# Patient Record
Sex: MALE | Race: WHITE | NOT HISPANIC OR LATINO
[De-identification: names, ages, dates, MRNs, and addresses within clinical notes are randomized per-mention and may not be internally consistent; named-entity substitution may affect disease eponyms.]

---

## 2018-02-09 PROBLEM — Z00.00 ENCOUNTER FOR PREVENTIVE HEALTH EXAMINATION: Status: ACTIVE | Noted: 2018-02-09

## 2018-03-16 ENCOUNTER — APPOINTMENT (OUTPATIENT)
Dept: NEUROLOGY | Facility: CLINIC | Age: 83
End: 2018-03-16
Payer: MEDICARE

## 2018-03-16 VITALS
DIASTOLIC BLOOD PRESSURE: 90 MMHG | WEIGHT: 175 LBS | SYSTOLIC BLOOD PRESSURE: 160 MMHG | HEIGHT: 67 IN | BODY MASS INDEX: 27.47 KG/M2

## 2018-03-16 DIAGNOSIS — Z86.39 PERSONAL HISTORY OF OTHER ENDOCRINE, NUTRITIONAL AND METABOLIC DISEASE: ICD-10-CM

## 2018-03-16 PROCEDURE — 99204 OFFICE O/P NEW MOD 45 MIN: CPT

## 2018-12-25 ENCOUNTER — INPATIENT (INPATIENT)
Facility: HOSPITAL | Age: 83
LOS: 5 days | Discharge: HOME | DRG: 62 | End: 2018-12-31
Attending: HOSPITALIST | Admitting: INTERNAL MEDICINE
Payer: MEDICARE

## 2018-12-25 VITALS
HEIGHT: 66 IN | RESPIRATION RATE: 18 BRPM | HEART RATE: 75 BPM | OXYGEN SATURATION: 97 % | WEIGHT: 182.1 LBS | DIASTOLIC BLOOD PRESSURE: 84 MMHG | TEMPERATURE: 98 F | SYSTOLIC BLOOD PRESSURE: 223 MMHG

## 2018-12-25 PROCEDURE — 99291 CRITICAL CARE FIRST HOUR: CPT

## 2018-12-25 NOTE — ED PROVIDER NOTE - MEDICAL DECISION MAKING DETAILS
89 y/o male presents with confusion with last known well at 9:30 PM. 89 y/o male presents with confusion with last known well at 9:30 PM- pt with difficulty finding words, confused - unable toname objects with left temporal infarct - pt's family explained the risks and benefits of tpa and since there is hypodense region on CT increased risk of bleed also spoke to son carrillo on the phone all family members would like to proceed with tpa at this time. Spoke with PA MICU admit to MICU   CTA also ordered 89 y/o male presents with confusion with last known well at 9:30 PM- pt with difficulty finding words, confused - unable toname objects with left temporal infarct - pt's family explained the risks and benefits of tpa and since there is hypodense region on CT increased risk of bleed also spoke to son carrillo on the phone all family members would like to proceed with tpa at this time. There was also a delay in TPA administration due to blood pressure control. Spoke with PA MICU admit to MICU   CTA also ordered

## 2018-12-25 NOTE — ED ADULT TRIAGE NOTE - CHIEF COMPLAINT QUOTE
daughter states father at 2pm was well and at 230pm had slurred speech now resolved, one hour ago became confused and did not know who and where he was

## 2018-12-25 NOTE — ED PROVIDER NOTE - CRITICAL CARE PROVIDED
documentation/consult w/ pt's family directly relating to pts condition/additional history taking/consultation with other physicians/interpretation of diagnostic studies

## 2018-12-25 NOTE — ED PROVIDER NOTE - OBJECTIVE STATEMENT
89 y/o male presents with confusion. Patient noted to have woken up from nap with slurred speech and c/o of pain to the side of his nose around 2:30 PM.  Symptoms subsided on their own shortly after and patient was known to be well until 9:30 PM when he began forgetting family members names. Family at bedside reports that pt has been forgetful lately but has never forgotten names or been this severe in the past. Patient not on any medications. No alcohol consumption. Pt denies fevers/chills, ha, loc, cp/sob/palp, cough, abd pain/n/v/d, urinary symptoms, recent travel and sick contacts.  No further complaints at this time.

## 2018-12-26 ENCOUNTER — TRANSCRIPTION ENCOUNTER (OUTPATIENT)
Age: 83
End: 2018-12-26

## 2018-12-26 DIAGNOSIS — I63.412 CEREBRAL INFARCTION DUE TO EMBOLISM OF LEFT MIDDLE CEREBRAL ARTERY: ICD-10-CM

## 2018-12-26 DIAGNOSIS — I63.9 CEREBRAL INFARCTION, UNSPECIFIED: ICD-10-CM

## 2018-12-26 DIAGNOSIS — Z95.5 PRESENCE OF CORONARY ANGIOPLASTY IMPLANT AND GRAFT: Chronic | ICD-10-CM

## 2018-12-26 LAB
ALBUMIN SERPL ELPH-MCNC: 4.1 G/DL — SIGNIFICANT CHANGE UP (ref 3.3–5.2)
ALP SERPL-CCNC: 92 U/L — SIGNIFICANT CHANGE UP (ref 40–120)
ALT FLD-CCNC: 14 U/L — SIGNIFICANT CHANGE UP
AMMONIA BLD-MCNC: 34 UMOL/L — SIGNIFICANT CHANGE UP (ref 11–55)
AMPHET UR-MCNC: NEGATIVE — SIGNIFICANT CHANGE UP
ANION GAP SERPL CALC-SCNC: 9 MMOL/L — SIGNIFICANT CHANGE UP (ref 5–17)
APPEARANCE UR: CLEAR — SIGNIFICANT CHANGE UP
APTT BLD: 30.3 SEC — SIGNIFICANT CHANGE UP (ref 27.5–36.3)
AST SERPL-CCNC: 18 U/L — SIGNIFICANT CHANGE UP
BACTERIA # UR AUTO: ABNORMAL
BARBITURATES UR SCN-MCNC: NEGATIVE — SIGNIFICANT CHANGE UP
BASOPHILS # BLD AUTO: 0 K/UL — SIGNIFICANT CHANGE UP (ref 0–0.2)
BASOPHILS NFR BLD AUTO: 0.4 % — SIGNIFICANT CHANGE UP (ref 0–2)
BENZODIAZ UR-MCNC: NEGATIVE — SIGNIFICANT CHANGE UP
BILIRUB SERPL-MCNC: 0.3 MG/DL — LOW (ref 0.4–2)
BILIRUB UR-MCNC: NEGATIVE — SIGNIFICANT CHANGE UP
BUN SERPL-MCNC: 26 MG/DL — HIGH (ref 8–20)
CALCIUM SERPL-MCNC: 8.5 MG/DL — LOW (ref 8.6–10.2)
CHLORIDE SERPL-SCNC: 104 MMOL/L — SIGNIFICANT CHANGE UP (ref 98–107)
CK MB CFR SERPL CALC: 7.8 NG/ML — HIGH (ref 0–6.7)
CK SERPL-CCNC: 160 U/L — SIGNIFICANT CHANGE UP (ref 30–200)
CO2 SERPL-SCNC: 28 MMOL/L — SIGNIFICANT CHANGE UP (ref 22–29)
COCAINE METAB.OTHER UR-MCNC: NEGATIVE — SIGNIFICANT CHANGE UP
COLOR SPEC: YELLOW — SIGNIFICANT CHANGE UP
CREAT SERPL-MCNC: 1.23 MG/DL — SIGNIFICANT CHANGE UP (ref 0.5–1.3)
DIFF PNL FLD: ABNORMAL
EOSINOPHIL # BLD AUTO: 0.1 K/UL — SIGNIFICANT CHANGE UP (ref 0–0.5)
EOSINOPHIL NFR BLD AUTO: 1.7 % — SIGNIFICANT CHANGE UP (ref 0–6)
EPI CELLS # UR: SIGNIFICANT CHANGE UP
ETHANOL SERPL-MCNC: <10 MG/DL — SIGNIFICANT CHANGE UP
GLUCOSE SERPL-MCNC: 96 MG/DL — SIGNIFICANT CHANGE UP (ref 70–115)
GLUCOSE UR QL: NEGATIVE MG/DL — SIGNIFICANT CHANGE UP
HCT VFR BLD CALC: 41.5 % — LOW (ref 42–52)
HGB BLD-MCNC: 13.8 G/DL — LOW (ref 14–18)
INR BLD: 0.97 RATIO — SIGNIFICANT CHANGE UP (ref 0.88–1.16)
KETONES UR-MCNC: NEGATIVE — SIGNIFICANT CHANGE UP
LEUKOCYTE ESTERASE UR-ACNC: NEGATIVE — SIGNIFICANT CHANGE UP
LYMPHOCYTES # BLD AUTO: 1.6 K/UL — SIGNIFICANT CHANGE UP (ref 1–4.8)
LYMPHOCYTES # BLD AUTO: 22.3 % — SIGNIFICANT CHANGE UP (ref 20–55)
MCHC RBC-ENTMCNC: 30.7 PG — SIGNIFICANT CHANGE UP (ref 27–31)
MCHC RBC-ENTMCNC: 33.3 G/DL — SIGNIFICANT CHANGE UP (ref 32–36)
MCV RBC AUTO: 92.4 FL — SIGNIFICANT CHANGE UP (ref 80–94)
METHADONE UR-MCNC: NEGATIVE — SIGNIFICANT CHANGE UP
MONOCYTES # BLD AUTO: 0.6 K/UL — SIGNIFICANT CHANGE UP (ref 0–0.8)
MONOCYTES NFR BLD AUTO: 8.6 % — SIGNIFICANT CHANGE UP (ref 3–10)
NEUTROPHILS # BLD AUTO: 4.7 K/UL — SIGNIFICANT CHANGE UP (ref 1.8–8)
NEUTROPHILS NFR BLD AUTO: 66.7 % — SIGNIFICANT CHANGE UP (ref 37–73)
NITRITE UR-MCNC: NEGATIVE — SIGNIFICANT CHANGE UP
OPIATES UR-MCNC: NEGATIVE — SIGNIFICANT CHANGE UP
PCP SPEC-MCNC: SIGNIFICANT CHANGE UP
PCP UR-MCNC: NEGATIVE — SIGNIFICANT CHANGE UP
PH UR: 7 — SIGNIFICANT CHANGE UP (ref 5–8)
PLATELET # BLD AUTO: 174 K/UL — SIGNIFICANT CHANGE UP (ref 150–400)
POTASSIUM SERPL-MCNC: 4 MMOL/L — SIGNIFICANT CHANGE UP (ref 3.5–5.3)
POTASSIUM SERPL-SCNC: 4 MMOL/L — SIGNIFICANT CHANGE UP (ref 3.5–5.3)
PROT SERPL-MCNC: 6.9 G/DL — SIGNIFICANT CHANGE UP (ref 6.6–8.7)
PROT UR-MCNC: 30 MG/DL
PROTHROM AB SERPL-ACNC: 11.1 SEC — SIGNIFICANT CHANGE UP (ref 10–12.9)
RBC # BLD: 4.49 M/UL — LOW (ref 4.6–6.2)
RBC # FLD: 13.5 % — SIGNIFICANT CHANGE UP (ref 11–15.6)
RBC CASTS # UR COMP ASSIST: SIGNIFICANT CHANGE UP /HPF (ref 0–4)
SODIUM SERPL-SCNC: 141 MMOL/L — SIGNIFICANT CHANGE UP (ref 135–145)
SP GR SPEC: 1.01 — SIGNIFICANT CHANGE UP (ref 1.01–1.02)
THC UR QL: NEGATIVE — SIGNIFICANT CHANGE UP
TROPONIN T SERPL-MCNC: 0.02 NG/ML — SIGNIFICANT CHANGE UP (ref 0–0.06)
UROBILINOGEN FLD QL: NEGATIVE MG/DL — SIGNIFICANT CHANGE UP
WBC # BLD: 7.1 K/UL — SIGNIFICANT CHANGE UP (ref 4.8–10.8)
WBC # FLD AUTO: 7.1 K/UL — SIGNIFICANT CHANGE UP (ref 4.8–10.8)
WBC UR QL: SIGNIFICANT CHANGE UP

## 2018-12-26 PROCEDURE — 70496 CT ANGIOGRAPHY HEAD: CPT | Mod: 26

## 2018-12-26 PROCEDURE — 70450 CT HEAD/BRAIN W/O DYE: CPT | Mod: 26,59

## 2018-12-26 PROCEDURE — 99223 1ST HOSP IP/OBS HIGH 75: CPT

## 2018-12-26 PROCEDURE — 70498 CT ANGIOGRAPHY NECK: CPT | Mod: 26

## 2018-12-26 PROCEDURE — 93306 TTE W/DOPPLER COMPLETE: CPT | Mod: 26

## 2018-12-26 PROCEDURE — 93010 ELECTROCARDIOGRAM REPORT: CPT

## 2018-12-26 PROCEDURE — 70450 CT HEAD/BRAIN W/O DYE: CPT | Mod: 26,77,59

## 2018-12-26 PROCEDURE — 99233 SBSQ HOSP IP/OBS HIGH 50: CPT

## 2018-12-26 RX ORDER — ATORVASTATIN CALCIUM 80 MG/1
80 TABLET, FILM COATED ORAL AT BEDTIME
Qty: 0 | Refills: 0 | Status: DISCONTINUED | OUTPATIENT
Start: 2018-12-26 | End: 2018-12-31

## 2018-12-26 RX ORDER — NICARDIPINE HYDROCHLORIDE 30 MG/1
5 CAPSULE, EXTENDED RELEASE ORAL
Qty: 40 | Refills: 0 | Status: DISCONTINUED | OUTPATIENT
Start: 2018-12-26 | End: 2018-12-26

## 2018-12-26 RX ORDER — ACETAMINOPHEN 500 MG
1000 TABLET ORAL ONCE
Qty: 0 | Refills: 0 | Status: COMPLETED | OUTPATIENT
Start: 2018-12-26 | End: 2018-12-26

## 2018-12-26 RX ORDER — ONDANSETRON 8 MG/1
4 TABLET, FILM COATED ORAL ONCE
Qty: 0 | Refills: 0 | Status: COMPLETED | OUTPATIENT
Start: 2018-12-26 | End: 2018-12-26

## 2018-12-26 RX ORDER — METOCLOPRAMIDE HCL 10 MG
10 TABLET ORAL ONCE
Qty: 0 | Refills: 0 | Status: COMPLETED | OUTPATIENT
Start: 2018-12-26 | End: 2018-12-26

## 2018-12-26 RX ORDER — ALTEPLASE 100 MG
7 KIT INTRAVENOUS ONCE
Qty: 0 | Refills: 0 | Status: COMPLETED | OUTPATIENT
Start: 2018-12-26 | End: 2018-12-26

## 2018-12-26 RX ORDER — MAGNESIUM SULFATE 500 MG/ML
2 VIAL (ML) INJECTION ONCE
Qty: 0 | Refills: 0 | Status: COMPLETED | OUTPATIENT
Start: 2018-12-26 | End: 2018-12-26

## 2018-12-26 RX ORDER — LABETALOL HCL 100 MG
10 TABLET ORAL ONCE
Qty: 0 | Refills: 0 | Status: COMPLETED | OUTPATIENT
Start: 2018-12-26 | End: 2018-12-26

## 2018-12-26 RX ORDER — ALTEPLASE 100 MG
66 KIT INTRAVENOUS ONCE
Qty: 0 | Refills: 0 | Status: COMPLETED | OUTPATIENT
Start: 2018-12-26 | End: 2018-12-26

## 2018-12-26 RX ORDER — HYDRALAZINE HCL 50 MG
10 TABLET ORAL
Qty: 0 | Refills: 0 | Status: DISCONTINUED | OUTPATIENT
Start: 2018-12-26 | End: 2018-12-27

## 2018-12-26 RX ORDER — FENTANYL CITRATE 50 UG/ML
25 INJECTION INTRAVENOUS
Qty: 0 | Refills: 0 | Status: DISCONTINUED | OUTPATIENT
Start: 2018-12-26 | End: 2018-12-31

## 2018-12-26 RX ORDER — ACETAMINOPHEN 500 MG
650 TABLET ORAL EVERY 6 HOURS
Qty: 0 | Refills: 0 | Status: DISCONTINUED | OUTPATIENT
Start: 2018-12-26 | End: 2018-12-31

## 2018-12-26 RX ORDER — ATORVASTATIN CALCIUM 80 MG/1
1 TABLET, FILM COATED ORAL
Qty: 0 | Refills: 0 | COMMUNITY

## 2018-12-26 RX ADMIN — ALTEPLASE 7 MILLIGRAM(S): KIT at 01:07

## 2018-12-26 RX ADMIN — FENTANYL CITRATE 25 MICROGRAM(S): 50 INJECTION INTRAVENOUS at 09:00

## 2018-12-26 RX ADMIN — Medication 10 MILLIGRAM(S): at 00:46

## 2018-12-26 RX ADMIN — FENTANYL CITRATE 25 MICROGRAM(S): 50 INJECTION INTRAVENOUS at 08:44

## 2018-12-26 RX ADMIN — Medication 1000 MILLIGRAM(S): at 06:50

## 2018-12-26 RX ADMIN — Medication 650 MILLIGRAM(S): at 16:04

## 2018-12-26 RX ADMIN — NICARDIPINE HYDROCHLORIDE 25 MG/HR: 30 CAPSULE, EXTENDED RELEASE ORAL at 02:23

## 2018-12-26 RX ADMIN — Medication 400 MILLIGRAM(S): at 06:30

## 2018-12-26 RX ADMIN — ONDANSETRON 4 MILLIGRAM(S): 8 TABLET, FILM COATED ORAL at 02:56

## 2018-12-26 RX ADMIN — Medication 50 GRAM(S): at 03:00

## 2018-12-26 RX ADMIN — Medication 10 MILLIGRAM(S): at 15:33

## 2018-12-26 RX ADMIN — Medication 650 MILLIGRAM(S): at 21:40

## 2018-12-26 RX ADMIN — Medication 10 MILLIGRAM(S): at 03:13

## 2018-12-26 RX ADMIN — Medication 650 MILLIGRAM(S): at 15:34

## 2018-12-26 RX ADMIN — ALTEPLASE 420 MILLIGRAM(S): KIT at 01:06

## 2018-12-26 RX ADMIN — Medication 650 MILLIGRAM(S): at 22:30

## 2018-12-26 RX ADMIN — NICARDIPINE HYDROCHLORIDE 25 MG/HR: 30 CAPSULE, EXTENDED RELEASE ORAL at 00:50

## 2018-12-26 RX ADMIN — Medication 10 MILLIGRAM(S): at 08:19

## 2018-12-26 RX ADMIN — ALTEPLASE 66 MILLIGRAM(S): KIT at 02:00

## 2018-12-26 RX ADMIN — Medication 10 MILLIGRAM(S): at 02:16

## 2018-12-26 RX ADMIN — ALTEPLASE 66 MILLIGRAM(S): KIT at 01:09

## 2018-12-26 RX ADMIN — ATORVASTATIN CALCIUM 80 MILLIGRAM(S): 80 TABLET, FILM COATED ORAL at 21:04

## 2018-12-26 NOTE — CHART NOTE - NSCHARTNOTEFT_GEN_A_CORE
Spoke with family at bedside. Pt is currently NPO s/p tPA. Family reports pt eating well PTA, following a regular diet with no issues chewing/swallowing. Reports pt does not eat any foods with seeds or any spicy foods and follows an overall bland diet. Preferences obtained. RD remains available.

## 2018-12-26 NOTE — STROKE CODE NOTE - ASSESSMENT/PLAN
89 years old man with vascular risk factors of age is evaluated at OSH for acute onset of language disturbance. He was reported to be completely normal at around 8:30 PM when he had a normal fluent conversation with his wife. He took a nap thereafter and upon waking up at around 9 PM, he was noted to have acute onset of "memory disturbance" as he was not able to remember his family members names. Examination of her stroke reveals moderate to severe aphasia and disorientation to month and his age. CT brain shows early changes of ischemia in the left MCA distribution involving left temporal lobe.    Impression:  Cerebral embolism with cerebral infarction. Left MCA distribution stroke - likely etiology being embolic stroke from undetermined source, but possibility of large vessel disease needs to be ruled out

## 2018-12-26 NOTE — CHART NOTE - NSCHARTNOTEFT_GEN_A_CORE
-called by noa BECERRA, patient has just arrived to CTICU from ER  -hypertensive with   -cardene, which was running in ED, not running ON CTICU arrival, despite active order  -have discussed with noa RN will give IVP hydralazine, hang cardene STAT and begin titrating cardene for goal -160

## 2018-12-26 NOTE — DISCHARGE NOTE ADULT - MEDICATION SUMMARY - MEDICATIONS TO TAKE
I will START or STAY ON the medications listed below when I get home from the hospital:    aspirin 81 mg oral delayed release tablet  -- 1 tab(s) by mouth once a day  -- Indication: For Stroke    acetaminophen 325 mg oral tablet  -- 2 tab(s) by mouth every 6 hours, As needed, Mild Pain (1 - 3), Moderate Pain (4 - 6)  -- Indication: For Pain    atorvastatin 80 mg oral tablet  -- 1 tab(s) by mouth once a day  -- Indication: For Stroke Prevention    clopidogrel 75 mg oral tablet  -- 1 tab(s) by mouth once a day  -- Indication: For Stroke Prevention    amLODIPine 5 mg oral tablet  -- 1 tab(s) by mouth once a day  -- Indication: For High Blood Pressure I will START or STAY ON the medications listed below when I get home from the hospital:    aspirin 81 mg oral delayed release tablet  -- 1 tab(s) by mouth once a day  -- Indication: For Stroke    acetaminophen 325 mg oral tablet  -- 2 tab(s) by mouth every 6 hours, As needed, Mild Pain (1 - 3), Moderate Pain (4 - 6)  -- Indication: For Pain     atorvastatin 80 mg oral tablet  -- 1 tab(s) by mouth once a day  -- Indication: For Stroke    clopidogrel 75 mg oral tablet  -- 1 tab(s) by mouth once a day  -- Indication: For Stroke    amLODIPine 5 mg oral tablet  -- 1 tab(s) by mouth once a day  -- Indication: For Hypertension

## 2018-12-26 NOTE — STROKE CODE NOTE - SUBJECTIVE
89 years old man was reported to be completely normal. Up until 8:30 PM when he is in normal conversation with his wife before falling asleep. At around 9 PM, when he woke up; he was noted to have memory disturbance described as he was not able to remember the names of his family members. He was not reported to have any other focal neurological symptoms. Of note, he was also reported to have an episode of dysarthria at around 2:30 PM, which lasted for about half an hour or so with complete resolution of his neurological symptoms/deficits. He was reported to be completely normal with normal language function from 2:30 PM until 8:30 PM.

## 2018-12-26 NOTE — PROGRESS NOTE ADULT - ASSESSMENT
89 yo male with hx of CAD s/p remote PCI, admitted with aphasia, given TPA for stroke.  Currently with no focal motor deficits.  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    Impression:  stroke s/p TPA    Plan  - repeat CT brain at 24 hours if no bleeding then can start ASA and SC heparin  - awaiting echo, carotid imaging already performed  - neurology consult called  - Lipitor   - PT eval, dysphagia screen

## 2018-12-26 NOTE — DISCHARGE NOTE ADULT - PLAN OF CARE
Rehabilitation You had a infarction in your brain which means that your brain was not getting enough blood, and some of the brain was damaged, commonly called a stroke. This affected your ability to understand and speak a language.   It is important to make sure that you continue to take all medications as prescribed to help prevent having another stroke event. You will need Physical and Speech therapy, and it is important that you participate to the best of ability. Follow up with Neurology within 2 weeks and follow up with your PMD within one week. If you feel that you are having similar symptoms again, be sure to seek medical attention immediately as it is possible to have another stroke even if you just had one. Blood Pressure < 140/90 Please continue to take all medications as prescribed. Follow up with your PMD within a week. Eat a DASH diet outlined above  Please check your Blood pressure at home and if the top number is always over 150 or if the bottom number is always over 100, please call your doctor Monitor You have some blockage in one of the arteries leading to your brain on the right side. Some of the plaque from the artery blockage may have broken off and traveled to your brain causing the stroke. It is important that you take Aspirin, Plavix and the statin as prescribed. This will help prevent a recurrence. You were seen by vascular surgery and they do not recommend surgery at this time. Please follow up with your PMD within one week. On this hospital visit we found a lung mass, 2cm in size in the right upper lobe. Your family decided not to pursue treatment at this time. If this is not treated, it could grow in size and metastasize. The risks were discussed with your family. Please follow up with your PMD within one week. Control Continue to take your medicines as prescribed and follow up with your PMD within one week. Your imaging and test revealed a 4mm pancreatic lesion. Monitor-Follow up with your primary care physician You have some blockage in carotid arteries. Some of the plaque from the artery blockage may have broken off and traveled to your brain causing the stroke. It is important that you take Aspirin, Plavix and the statin as prescribed. This will help prevent a recurrence. You were seen by vascular surgery and they do not recommend surgery at this time. Please follow up with your PMD within one week. Blood Pressure < 150/90 Continue following a healthy lifestyle, this includes exercising 150minutes a week or as much as tolerated, and eating a healthy balanced diet consisting of fresh fruits and veggies. Be sure to take all medications as prescribed, do not miss them! Follow up with your primary care doctor and/or Cardiologist. If you have chest pain, be sure to come to the ER immediately for an evaluation. stable, no e/o bleeding noted You had an acute MCA CVA (stroke) and this was likely due to carotid artery stenosis (right) - see results below   -Received TPA on 12/26 with repeat negative ct brain (no bleeding)   -improved clinically   -bp and cholesterol control Please check your Blood pressure at home and if the top number is always over 150 or if the bottom number is always over 100, please call your doctor   Continue with norvasc and monitor blood pressure On this hospital visit we found a lung mass, 2cm in size in the right upper lobe. Your family decided not to pursue treatment at this time. They have expressed understanding of this lesion and what it means. This is a known lesion in your care continue with aspirin, statin and blood pressure control Your imaging and test revealed a 4mm pancreatic lesion.  No further intervention   This is a new finding

## 2018-12-26 NOTE — DISCHARGE NOTE ADULT - CARE PROVIDER_API CALL
Pee Wallace), Neurology; Vascular Neurology  [address] [facility]  [address]  [place] [address]  Phone: [phone]  Fax: [phone]    ManvarSingh, Pallavi B (MD), Surgery Vascular  [address]  [address]st [place] [address]  Phone: [phone]  Fax: [phone] Pee Wallace), Neurology; Vascular Neurology  [address] [facility]  [address]  [place] [address]  Phone: [phone]  Fax: [phone]    ManvarSingh, Pallavi B (MD), Surgery Vascular  [address] [facility]  1st [place] [address]  Phone: [phone]  Fax: [phone]    Ray Hart), Internal Medicine  [address] [place] [address]  Phone: [phone]  Fax: [phone] Pee Wallace), Neurology; Vascular Neurology  [address] [facility]  [address]  [place] [address]  Phone: [phone]  Fax: [phone]    Ray Hart), Internal Medicine  [address] [place] [address]  Phone: [phone]  Fax: [phone]

## 2018-12-26 NOTE — CHART NOTE - NSCHARTNOTEFT_GEN_A_CORE
-patient c/o headache  -now 5.5 hrs s/p tPA  -will give IV tylenol and send for STAT head CT  to r/o ICH

## 2018-12-26 NOTE — DISCHARGE NOTE ADULT - HOSPITAL COURSE
· Assessment		  89 y/o male with pmhx of remote (>10yrs) history of cardiac stents presents with slurred speech and forgetting family members names found to have left temporal infarct on CT head. Found to have  placed on Cardene infusion then s/p tPA at 1:09 am on 12/26. Ct angio head shows complete block of blood flow to right vertebral artery. Pt received TPA. Cardene infusion discontinued prior to CT-ICU transfer. Repeat CT after 24 hours shows no hemorrhage or worsening of infarction. For Acute left MCA CVA pt was placed on ASA 81 mg po qdaily,  plavix 75 daily, Atorvastatin 80 mg po qdaily. Pain was controled with Tylenol prn for mild, Fentanyl 25mcg for severe, aspiration and fall precautions were in place. For Hypertension, pt had no PMH HTN prior to admission after 24 hrs permissive HTN it was controlled with Norvasc 5 mg PO daily and Hydralazine 10 mg iv q2h PRN for SBP > 180. A Right carotid artery 50-69% stenosis caused by a moderate to severe calcified atherosclerotic plaque disease within carotid bulb and distal common carotid artery. MRA and US Carotid imaged it, and upon discussion with vascular and family, no intervention is planned at this time. 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 The team will discussed the results of CT chest with family and they chose know further workup. The pt does not currently have capacity. The family requests that the patient is not told and given his capacity, their request was honored. For CAD, he was on statin and ASA. For DVT PPx S/P tPA, he was put on SCD boots. Pt continues to have expressive + receptive aphasia persistent. Goals of Care discussed at length with the patient's wife + daughter. This conversation included current condition, prognosis, and options for therapy. Discussed desires by patient for further care and wishes were expressed - current management accepted but discussed at length findings on CT chest-abd-pelvis. discussed poss + prob of malignancy.  Advanced directives discussed.     Pt is medically optimized for discharge.    This discharge took aproximately 45 minutes. 89 y/o male with pmhx of remote (>10yrs) history of cardiac stents presented with slurred speech and forgetting family members names found to have left temporal infarct on CT head. Found to have  placed on Cardene infusion then s/p tPA at 1:09 am on 12/26. Ct angio head shows complete block of blood flow to right vertebral artery. Pt received TPA. Cardene infusion discontinued prior to CT-ICU transfer. Repeat CT after 24 hours shows no hemorrhage or worsening of infarction. For Acute left MCA CVA pt was placed on ASA 81 mg po qdaily,  plavix 75 daily, Atorvastatin 80 mg po qdaily. Pain was controlled with Tylenol prn for mild, Fentanyl 25mcg for severe, aspiration and fall precautions were in place. For Hypertension, pt had no PMH HTN prior to admission after 24 hrs permissive HTN it was controlled with Norvasc 5 mg PO daily and Hydralazine 10 mg iv q2h PRN for SBP > 180. A Right carotid artery 50-69% stenosis caused by a moderate to severe calcified atherosclerotic plaque disease within carotid bulb and distal common carotid artery. MRA and US Carotid imaged it, and upon discussion with vascular and family, no intervention is planned at this time. 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 The team will discussed the results of CT chest with family and they chose know further workup. The pt does not currently have capacity. The family requests that the patient is not told and given his capacity, their request was honored. For CAD, he was on statin and ASA. For DVT PPx S/P tPA, he was put on SCD boots. Pt continues to have expressive + receptive aphasia persistent. Goals of Care discussed at length with the patient's wife + daughter. This conversation included current condition, prognosis, and options for therapy. Discussed desires by patient for further care and wishes were expressed - current management accepted but discussed at length findings on CT chest-abd-pelvis. discussed poss + prob of malignancy.  Advanced directives discussed.     Pt is medically optimized for discharge.    This discharge took aproximately 45 minutes. 89 y/o male with pmhx of remote (>10yrs) history of cardiac stents presented with slurred speech and forgetting family members names found to have an acute left MCA CVA. Found to have  placed on Cardene infusion then s/p tPA at 1:09 am on 12/26. Ct angio head shows complete block of blood flow to right vertebral artery. Pt received TPA. Cardene infusion discontinued prior to CT-ICU transfer. Repeat CT after 24 hours showed no hemorrhage or worsening of infarction. For Acute left MCA CVA pt was placed on ASA 81 mg po qdaily,  plavix 75 daily, Atorvastatin 80 mg po qdaily. Pain was controlled with Tylenol prn for mild, Fentanyl 25mcg for severe, aspiration and fall precautions were in place. For Hypertension, pt had no PMH HTN prior to admission after 24 hrs permissive HTN it was controlled with Norvasc 5 mg PO daily and Hydralazine 10 mg iv q2h PRN for SBP > 180. A Right carotid artery 50-69% stenosis caused by a moderate to severe calcified atherosclerotic plaque disease within carotid bulb and distal common carotid artery. MRA and US Carotid imaged it, and upon discussion with vascular and family, no intervention is planned at this time. 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 The team discussed the results of CT chest with family and they chose no further workup. The pt does not currently have capacity. The family requests that the patient is not told and given his capacity, their request was honored. For CAD, he was on statin and ASA. For DVT PPx S/P tPA, he was put on SCD boots. Pt continues to have expressive + receptive aphasia persistent; mild when examined on 12/31. Goals of Care discussed at length with the patient's wife + daughter. This conversation included current condition, prognosis, and options for therapy. Discussed desires by patient for further care and wishes were expressed - current management accepted but discussed at length findings on CT chest-abd-pelvis. discussed poss + prob of malignancy.  Advanced directives discussed. DNR/I decided     Pt is medically optimized for discharge.    This discharge took approximately 45 minutes.

## 2018-12-26 NOTE — CHART NOTE - NSCHARTNOTEFT_GEN_A_CORE
-called by [facility] about the following CTA head/neck findings:    < from: CT Angio Head w/ IV Cont (12.26.18 @ 01:53) >    IMPRESSION:   1. 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 There   appears to   be tapering of flow proximal to the occlusion seen on series 9 images   86-88.   Vertebral artery dissection should be considered. This finding is age   indeterminate.   2. Limited visualization of a 2.0 x 1.9 cm mass lesion in right upper   lobe seen   on series 6 image 179. Recommend further workup with CT of the chest.   Malignancy is in the differential diagnosis.   3. Findings as described above in the left internal carotid artery   consistent   with moderate stenosis measuring 50-69%.   4. Findings as described above in right internal carotid artery   consistent with   moderate stenosis measuring 56%.       < end of copied text >      -have called and discussed with eICU attending, who called to update Dr. miller (Stroke neurologist who evaluated patient in ED prior to tPA)  -tPA would have been treatment for vertebral artery occulsion, which patient already received.    -will continue with current treatment plan of SBP control and Q1H neyuro checks  -neuro consult in AM  -patient given zofran for nausea,  -complain of left tooth pain but denies headaches at this time  -no new focal defectics  -will have repaet head CT in AM

## 2018-12-26 NOTE — CONSULT NOTE ADULT - SUBJECTIVE AND OBJECTIVE BOX
NOTE IS INCOMPLETE    APHASIA  LEFT MCA CVA  S/P TPA  NEURO STABLE  POST TPA ORDERS    FULL NOTE TO FOLLOW [facility]                                     Ne[facility]                                 [facility]                                  [address]                                        [place], [place], [address]                                             [phone]    CC: stroke  HPI: The patient is a 89y Male who presented to [facility] with acute onset of aphasia and confusion.  He was evaluated by telestroke, and although an early small left temporal lobe CVA was seen, tPA was administered because his deficits were larger than what should have been caused by the stroke on CT.  He remains aphasic and history is copires from chart.  Neuro eval is requested.    PAST MEDICAL & SURGICAL HISTORY:  No pertinent past medical history  H/O heart artery stent: x2      MEDICATIONS  (STANDING):  atorvastatin 80 milliGRAM(s) Oral at bedtime    MEDICATIONS  (PRN):  acetaminophen   Tablet .. 650 milliGRAM(s) Oral every 6 hours PRN Mild Pain (1 - 3), Moderate Pain (4 - 6)  fentaNYL    Injectable 25 MICROGram(s) IV Push every 3 hours PRN Severe Pain (7 - 10)  hydrALAZINE Injectable 10 milliGRAM(s) IV Push every 2 hours PRN SBP >180      Allergies    sulfa drugs (Short breath)    Intolerances      SOCIAL HISTORY:  no tob,   no alcohol   no drugs    FAMILY HISTORY:  No pertinent family history in first degree relatives      ROS: aphasic    Vital Signs Last 24 Hrs  T(C): 37.1 (26 Dec 2018 11:06), Max: 37.2 (26 Dec 2018 02:16)  T(F): 98.8 (26 Dec 2018 11:06), Max: 99 (26 Dec 2018 02:16)  HR: 83 (26 Dec 2018 14:06) (66 - 94)  BP: 138/72 (26 Dec 2018 14:06) (114/68 - 223/84)  BP(mean): 86 (26 Dec 2018 14:06) (82 - 142)  RR: 25 (26 Dec 2018 14:06) (11 - 46)  SpO2: 94% (26 Dec 2018 14:06) (92% - 99%)      General: NAD    Detailed Neurologic Exam:    Mental status: The patient is awake and alert and has expressive>receptive aphasia.    Cranial nerves: Pupils equal and react symmetrically to light. There is no visual field deficit to threat. Extraocular motion is full with no nystagmus. There is no ptosis. Facial sensation is intact. Facial musculature is symmetric. Palate elevates symmetrically. Shoulder shrug is normal. Tongue is midline.    Motor: There is normal bulk and tone.  There is no tremor.  Unable to formally test power due to aphasia, but moves 4 ext well    Sensation: grimace to pin in 4 extremities    Reflexes: 1+ throughout and plantar responses are flexor.    Cerebellar: There is no dysmetria on finger to nose testing.    Gait : deferred    LABS:                         13.8   7.1   )-----------( 174      ( 26 Dec 2018 00:23 )             41.5       12-26    141  |  104  |  26.0<H>  ----------------------------<  96  4.0   |  28.0  |  1.23    Ca    8.5<L>      26 Dec 2018 00:23    TPro  6.9  /  Alb  4.1  /  TBili  0.3<L>  /  DBili  x   /  AST  18  /  ALT  14  /  AlkPhos  92  12-26      PT/INR - ( 26 Dec 2018 00:23 )   PT: 11.1 sec;   INR: 0.97 ratio         PTT - ( 26 Dec 2018 00:23 )  PTT:30.3 sec        RADIOLOGY & ADDITIONAL STUDIES (independently reviewed unless otherwise noted):  CT head- no blood, left temporal lobe CVA, acute, no masses

## 2018-12-26 NOTE — ED ADULT NURSE REASSESSMENT NOTE - NS ED NURSE REASSESS COMMENT FT1
ICU WAI Walker @ bedside for evaluation, risks v benefits explained and discussed w/ pt family, family agrees to go ahead w/ TPA administration.

## 2018-12-26 NOTE — DISCHARGE NOTE ADULT - PATIENT PORTAL LINK FT
You can access the [facility] Patient Portal, offered by [facility], by registering with the following website: [email or web address]

## 2018-12-26 NOTE — PATIENT PROFILE ADULT - NSPROEDALEARNPREFOTH_GEN_A_NUR
Referral was sent from Dr. Lindquist to schedule an Colonoscopy, left an voicemail for patient to call the office back in regards to scheduling procedure.    group instruction

## 2018-12-26 NOTE — DISCHARGE NOTE ADULT - SECONDARY DIAGNOSIS.
Essential hypertension Stenosis of right carotid artery Lung mass Coronary artery disease involving native coronary artery of native heart without angina pectoris Pancreatic lesion Bilateral carotid artery stenosis Normocytic anemia

## 2018-12-26 NOTE — ED ADULT NURSE NOTE - NSIMPLEMENTINTERV_GEN_ALL_ED
Implemented All Fall with Harm Risk Interventions:  [place] to call system. Call bell, personal items and telephone within reach. Instruct patient to call for assistance. Room bathroom lighting operational. Non-slip footwear when patient is off stretcher. Physically safe environment: no spills, clutter or unnecessary equipment. Stretcher in lowest position, wheels locked, appropriate side rails in place. Provide visual cue, wrist band, yellow gown, etc. Monitor gait and stability. Monitor for mental status changes and reorient to person, place, and time. Review medications for side effects contributing to fall risk. Reinforce activity limits and safety measures with patient and family. Provide visual clues: red socks.

## 2018-12-26 NOTE — PATIENT PROFILE ADULT - STATED REASON FOR ADMISSION
"he was asleep and when he woke up his voice was slurry. then it went away. then a couple hours later, he took a shower, ate, and then last evening he couldn't remember my name or his sons name."

## 2018-12-26 NOTE — PROGRESS NOTE ADULT - SUBJECTIVE AND OBJECTIVE BOX
INTERVAL HPI/OVERNIGHT EVENTS: admitted overnight with difficulty speaking, given TPA for suspected stroke, complained of headaches today - repeat CT brain without acute bleed.    On Admission  18  HPI:  This is a 89M w/ a remote (>10yrs) history of cardiac stents, not on any blood thinners, no other medical problems. Patient arrives to [facility] ED with acute onset of word finding difficulties and not recognizing family members.   -STAT head CT done in ED showed:  < from: CT Brain Stroke Protocol (18 @ 00:13) >  IMPRESSION:  No acute intracranial hemorrhage. Acute/subacute left temporal lobe   infarct. Additional age-indeterminate and chronic findings as above.    I discussed the findings with Dr. Dominguez at 12:20 AM on 2018 with   read back verification.    < end of copied text >      -patient was then evaluated by telestroke neurologist who deemed patient candidate for tPA. However patient was noted to be hypertensive with SBP >190 (despite no PMH of HTN) he was given IVP labetalol and started on cardene drip by ER staff prior to recieiving tPA  -tPA given at 0100 on 18    -Myself and telestroke MD have explained [facility] risks and benefits of tPA including increased risk of intracranial bleeding (26 Dec 2018 01:25)    PAST MEDICAL & SURGICAL HISTORY:  No pertinent past medical history  H/O heart artery stent: x2      Antimicrobial:    Cardiovascular:  hydrALAZINE Injectable 10 milliGRAM(s) IV Push every 2 hours PRN    Pulmonary:    Hematalogic:    Other:  atorvastatin 80 milliGRAM(s) Oral at bedtime  fentaNYL    Injectable 25 MICROGram(s) IV Push every 3 hours PRN      Drug Dosing Weight  Height (cm): 167.64 (25 Dec 2018 23:51)  Weight (kg): 82.304114897 (26 Dec 2018 00:14)  BMI (kg/m2): 29.4 (26 Dec 2018 00:14)  BSA (m2): 1.92 (26 Dec 2018 00:14)    T(C): 37.1 (18 @ 11:06), Max: 37.2 (18 @ 02:16)  HR: 82 (18 @ 11:06)  BP: 142/65 (18 @ 11:06)  BP(mean): 94 (18 @ 11:06)  ABP: --  ABP(mean): --  RR: 16 (18 @ 11:06)  SpO2: 94% (18 @ 11:06)           @ 07:01  -   @ 07:00  --------------------------------------------------------  IN: 45 mL / OUT: 1100 mL / NET: -1055 mL            PHYSICAL EXAM:    GENERAL: mildly uncomfortable  HEAD:  Atraumatic, normocephalic  EYES: EOMI, sclera clear   ENMT:  MMM, No oropharyngeal erythema or exudates  NECK:  Supple, normal appearance, trachea midline, no JVD noted  NERVOUS SYSTEM:  Alert, oriented to person but not place or time, Symmetric strength in all extremities, no focal weakness or sensory deficits, no facial droop.  CHEST/LUNG: Bilateral air entry, no chest deformity  HEART: Regular rate, regular rhythm  ABDOMEN: Soft, Nontender, Nondistended  EXTREMITIES:   no clubbing, no cyanosis.  LYMPH:  No lymphadenopathy noted  SKIN:  No visible rashes or skin lesions, good capillary refill    LABS:  CBC Full  -  ( 26 Dec 2018 00:23 )  WBC Count : 7.1 K/uL  Hemoglobin : 13.8 g/dL  Hematocrit : 41.5 %  Platelet Count - Automated : 174 K/uL  Mean Cell Volume : 92.4 fl  Mean Cell Hemoglobin : 30.7 pg  Mean Cell Hemoglobin Concentration : 33.3 g/dL  Auto Neutrophil # : 4.7 K/uL  Auto Lymphocyte # : 1.6 K/uL  Auto Monocyte # : 0.6 K/uL  Auto Eosinophil # : 0.1 K/uL  Auto Basophil # : 0.0 K/uL  Auto Neutrophil % : 66.7 %  Auto Lymphocyte % : 22.3 %  Auto Monocyte % : 8.6 %  Auto Eosinophil % : 1.7 %  Auto Basophil % : 0.4 %        141  |  104  |  26.0<H>  ----------------------------<  96  4.0   |  28.0  |  1.23    Ca    8.5<L>      26 Dec 2018 00:23    TPro  6.9  /  Alb  4.1  /  TBili  0.3<L>  /  DBili  x   /  AST  18  /  ALT  14  /  AlkPhos  92  12-    PT/INR - ( 26 Dec 2018 00:23 )   PT: 11.1 sec;   INR: 0.97 ratio         PTT - ( 26 Dec 2018 00:23 )  PTT:30.3 sec  Urinalysis Basic - ( 26 Dec 2018 00:57 )    Color: Yellow / Appearance: Clear / S.010 / pH: x  Gluc: x / Ketone: Negative  / Bili: Negative / Urobili: Negative mg/dL   Blood: x / Protein: 30 mg/dL / Nitrite: Negative   Leuk Esterase: Negative / RBC: 0-2 /HPF / WBC 0-2   Sq Epi: x / Non Sq Epi: Occasional / Bacteria: Occasional          RADIOLOGY personally reviewed- CT shows no evidence of hemorrhage

## 2018-12-26 NOTE — ED ADULT NURSE REASSESSMENT NOTE - NS ED NURSE REASSESS COMMENT FT1
Report given to receiving RN Cristiane in CT ICU, pt placed on portable monitor, going to CT prior to transport to floor, pt and family aware of plan of care.

## 2018-12-26 NOTE — ED ADULT NURSE NOTE - OBJECTIVE STATEMENT
As per pt family, he started to become confused around 830-9 pm tonight, having issues with memory not being able to remember wife/daughters name, hx of cardiac stents, no hx of stroke, denies blood thinners, resp even and unlabored, able to follow commands, not alert to birth date or place/names of family members

## 2018-12-26 NOTE — DISCHARGE NOTE ADULT - CARE PLAN
Principal Discharge DX:	Cerebrovascular accident (CVA) due to embolism of left middle cerebral artery  Goal:	Rehabilitation  Assessment and plan of treatment:	You had a infarction in your brain which means that your brain was not getting enough blood, and some of the brain was damaged, commonly called a stroke. This affected your ability to understand and speak a language.   It is important to make sure that you continue to take all medications as prescribed to help prevent having another stroke event. You will need Physical and Speech therapy, and it is important that you participate to the best of ability. Follow up with Neurology within 2 weeks and follow up with your PMD within one week. If you feel that you are having similar symptoms again, be sure to seek medical attention immediately as it is possible to have another stroke even if you just had one.  Secondary Diagnosis:	Essential hypertension  Goal:	Blood Pressure < 140/90  Assessment and plan of treatment:	Please continue to take all medications as prescribed. Follow up with your PMD within a week. Eat a DASH diet outlined above  Please check your Blood pressure at home and if the top number is always over 150 or if the bottom number is always over 100, please call your doctor  Secondary Diagnosis:	Stenosis of right carotid artery  Goal:	Monitor  Assessment and plan of treatment:	You have some blockage in one of the arteries leading to your brain on the right side. Some of the plaque from the artery blockage may have broken off and traveled to your brain causing the stroke. It is important that you take Aspirin, Plavix and the statin as prescribed. This will help prevent a recurrence. You were seen by vascular surgery and they do not recommend surgery at this time. Please follow up with your PMD within one week.  Secondary Diagnosis:	Lung mass  Goal:	Monitor  Assessment and plan of treatment:	On this hospital visit we found a lung mass, 2cm in size in the right upper lobe. Your family decided not to pursue treatment at this time. If this is not treated, it could grow in size and metastasize. The risks were discussed with your family. Please follow up with your PMD within one week.  Secondary Diagnosis:	Coronary artery disease involving native coronary artery of native heart without angina pectoris  Goal:	Control  Assessment and plan of treatment:	Continue to take your medicines as prescribed and follow up with your PMD within one week. Principal Discharge DX:	Cerebrovascular accident (CVA) due to embolism of left middle cerebral artery  Goal:	Rehabilitation  Assessment and plan of treatment:	You had a infarction in your brain which means that your brain was not getting enough blood, and some of the brain was damaged, commonly called a stroke. This affected your ability to understand and speak a language.   It is important to make sure that you continue to take all medications as prescribed to help prevent having another stroke event. You will need Physical and Speech therapy, and it is important that you participate to the best of ability. Follow up with Neurology within 2 weeks and follow up with your PMD within one week. If you feel that you are having similar symptoms again, be sure to seek medical attention immediately as it is possible to have another stroke even if you just had one.  Secondary Diagnosis:	Essential hypertension  Goal:	Blood Pressure < 150/90  Assessment and plan of treatment:	Please continue to take all medications as prescribed. Follow up with your PMD within a week. Eat a DASH diet outlined above  Please check your Blood pressure at home and if the top number is always over 150 or if the bottom number is always over 100, please call your doctor  Secondary Diagnosis:	Lung mass  Goal:	Monitor  Assessment and plan of treatment:	On this hospital visit we found a lung mass, 2cm in size in the right upper lobe. Your family decided not to pursue treatment at this time. If this is not treated, it could grow in size and metastasize. The risks were discussed with your family. Please follow up with your PMD within one week.  Secondary Diagnosis:	Coronary artery disease involving native coronary artery of native heart without angina pectoris  Goal:	Monitor  Assessment and plan of treatment:	Continue following a healthy lifestyle, this includes exercising 150minutes a week or as much as tolerated, and eating a healthy balanced diet consisting of fresh fruits and veggies. Be sure to take all medications as prescribed, do not miss them! Follow up with your primary care doctor and/or Cardiologist. If you have chest pain, be sure to come to the ER immediately for an evaluation.  Secondary Diagnosis:	Pancreatic lesion  Goal:	Control  Assessment and plan of treatment:	Your imaging and test revealed a 4mm pancreatic lesion.  Secondary Diagnosis:	Bilateral carotid artery stenosis  Goal:	Monitor-Follow up with your primary care physician  Assessment and plan of treatment:	You have some blockage in carotid arteries. Some of the plaque from the artery blockage may have broken off and traveled to your brain causing the stroke. It is important that you take Aspirin, Plavix and the statin as prescribed. This will help prevent a recurrence. You were seen by vascular surgery and they do not recommend surgery at this time. Please follow up with your PMD within one week. Principal Discharge DX:	Cerebrovascular accident (CVA) due to embolism of left middle cerebral artery  Goal:	Rehabilitation  Assessment and plan of treatment:	You had an acute MCA CVA (stroke) and this was likely due to carotid artery stenosis (right) - see results below   -Received TPA on 12/26 with repeat negative ct brain (no bleeding)   -improved clinically   -bp and cholesterol control  Secondary Diagnosis:	Essential hypertension  Goal:	Blood Pressure < 150/90  Assessment and plan of treatment:	Please check your Blood pressure at home and if the top number is always over 150 or if the bottom number is always over 100, please call your doctor   Continue with norvasc and monitor blood pressure  Secondary Diagnosis:	Lung mass  Goal:	Monitor  Assessment and plan of treatment:	On this hospital visit we found a lung mass, 2cm in size in the right upper lobe. Your family decided not to pursue treatment at this time. They have expressed understanding of this lesion and what it means. This is a known lesion in your care  Secondary Diagnosis:	Coronary artery disease involving native coronary artery of native heart without angina pectoris  Goal:	Monitor  Assessment and plan of treatment:	continue with aspirin, statin and blood pressure control  Secondary Diagnosis:	Pancreatic lesion  Goal:	Control  Assessment and plan of treatment:	Your imaging and test revealed a 4mm pancreatic lesion.  No further intervention   This is a new finding  Secondary Diagnosis:	Normocytic anemia  Assessment and plan of treatment:	stable, no e/o bleeding noted

## 2018-12-26 NOTE — DISCHARGE NOTE ADULT - PROVIDER TOKENS
TOKEN:'[id number]',[id number] TOKEN:'[id number]' TOKEN:'[id number]'

## 2018-12-26 NOTE — STROKE CODE NOTE - PROBLEM SELECTOR PLAN 1
Plan:   - Risks, benefits and adverse reactions associated with IV tPA including hemorrhagic stroke were discussed with patient and available family member in detail. After ruling out contraindications and obtaining verbal consent, patient would be treated with IV tPA in the ED. His last known well time was confirmed with his wife and available family members multiple times. 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 and CT brain shows early changes of ischemia/hypodensity in the left MCA distribution of less than 1/3 of the MCA territory thus based on clinical-radiological mismatch and patient being within the time window, benefits of being treated with IV tPA are thought to outweigh potential risks; which were thoroughly discussed with his wife and his daughter at bedside in detail   - Cont with IV tPA precautions including BP goal<185/110 mmHg before the bolus and <180/105 mmHg for first 24 hours after bolus followed by gradual normotension  - Not a candidate for neurointervention due to low NIHSS   - Aspirin and pharmacological DVT prophylaxis to be considered 24 hours after the IV tPA and repeat brain imaging. SCDs for DVT prophylaxis in the interim   - Atorvastatin 80 mg after establishing enteral access or passing swallow evaluation  - TTE with bubble study and telemetry to screen for possible cardiac source of embolism  - LDL and HbA1C, continue with aggressive vascular risk factors modifications   - PT/OT/Speech and swallow evaluation     Above mentioned plan was discussed with patient, available family member and [facility] ED MD in detail. All the questions were answered and concerns were addressed. Plan:   - Risks, benefits and adverse reactions associated with IV tPA including hemorrhagic stroke were discussed with patient and available family member in detail. After ruling out contraindications and obtaining verbal consent, patient would be treated with IV tPA in the ED. His last known well time was confirmed with his wife and available family members multiple times. 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 and CT brain shows early changes of ischemia/hypodensity in the left MCA distribution of less than 1/3 of the MCA territory thus based on clinical-radiological mismatch and patient being within the time window, benefits of being treated with IV tPA are thought to outweigh potential risks; which were thoroughly discussed with his wife and his daughter at bedside in detail   - Cont with IV tPA precautions including BP goal<185/110 mmHg before the bolus and <180/105 mmHg for first 24 hours after bolus followed by gradual normotension  - Not a candidate for neurointervention due to low NIHSS but could consider CTA head and neck STAT to eval for intracranial and extracranial cerebral vasculature   - Aspirin and pharmacological DVT prophylaxis to be considered 24 hours after the IV tPA and repeat brain imaging. SCDs for DVT prophylaxis in the interim   - Atorvastatin 80 mg after establishing enteral access or passing swallow evaluation  - TTE with bubble study and telemetry to screen for possible cardiac source of embolism  - LDL and HbA1C, continue with aggressive vascular risk factors modifications   - PT/OT/Speech and swallow evaluation     Above mentioned plan was discussed with patient, available family member and [facility] ED MD in detail. All the questions were answered and concerns were addressed.

## 2018-12-26 NOTE — DISCHARGE NOTE ADULT - OTHER SIGNIFICANT FINDINGS
< from: CT Abdomen and Pelvis w/ IV Cont (12.28.18 @ 16:19) >  LUNG PARENCHYMA: Evidence of a lobulatedand spiculated 2 cm sized right   upper lobe mass. This is consistent with a primary pulmonary malignancy.   There is subpleural and reticulonodular subsegmental atelectasis   involving the lower lobes bilaterally. Calcified granuloma dependent   right lung base.    PLEURA: Trace bilateral pleural effusions.  PANCREAS:  4 mm sized low density in the head of the pancreas. It may   represent an IPMN. Additional cystic pancreatic lesions are not   definitely excluded. Surveillance can be considered.  KIDNEYS:  2 subcentimeter sized cysts are suspected in the lower pole of   the left kidney but are too small to definitely characterize.  GI:  Uncomplicated diverticulosis predominantly involving the hepatic   flexure, descending and sigmoid colon.  :  Enlarged prostate compressing the bladder base. Seminal vesicles are   symmetrical.  SKELETAL:  No aggressive osseous lesion. Degenerative disc disease lumbar   spine.  ABDOMINAL WALL: Fat-containing inguinal hernias bilaterally right more   than left.    IMPRESSION:   Right upper lobe 2 cm sized spiculated lung mass consistent with a   primary pulmonary malignancy. A mildly enlarged right hilar node.  Enlarged prostate.  < end of copied text >    < from: CT Head No Cont (12.27.18 @ 01:04) >  IMPRESSION:   Continued evolution of acute-subacute left temporal infarct which appears   more conspicuous on current study. The overall area of involvement is   unchanged. No hemorrhage. No midline shift.    < end of copied text >    < from: CT Head No Cont (12.26.18 @ 07:55) >  IMPRESSION:      Stable moderate size acute/subacute left temporal lobe   infarct.    < end of copied text >    < from: CT Angio Neck w/ IV Cont (12.26.18 @ 01:54) >  IMPRESSION:   1. 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 There   appears to be tapering of flow proximal to the occlusion seen on series 9   images [id number]. Vertebral artery dissection should be considered. This   finding is age   indeterminate.   2. Limited visualization of a 2.0 x 1.9 cm mass lesion in right upper   lobe seen on series 6 image 179. Recommend further workup with CT of the   chest. Malignancy is in the differential diagnosis.   3. Findings as described above in the left internal carotid artery   consistent with moderate stenosis measuring 50-69%.   4. Findings as described above in right internal carotid artery   consistentwith moderate stenosis measuring 50-69%.     < end of copied text > < from: CT Abdomen and Pelvis w/ IV Cont (12.28.18 @ 16:19) >  LUNG PARENCHYMA: Evidence of a lobulatedand spiculated 2 cm sized right   upper lobe mass. This is consistent with a primary pulmonary malignancy.   There is subpleural and reticulonodular subsegmental atelectasis   involving the lower lobes bilaterally. Calcified granuloma dependent   right lung base.    PLEURA: Trace bilateral pleural effusions.  PANCREAS:  4 mm sized low density in the head of the pancreas. It may   represent an IPMN. Additional cystic pancreatic lesions are not   definitely excluded. Surveillance can be considered.  KIDNEYS:  2 subcentimeter sized cysts are suspected in the lower pole of   the left kidney but are too small to definitely characterize.  GI:  Uncomplicated diverticulosis predominantly involving the hepatic   flexure, descending and sigmoid colon.  :  Enlarged prostate compressing the bladder base. Seminal vesicles are   symmetrical.  SKELETAL:  No aggressive osseous lesion. Degenerative disc disease lumbar   spine.  ABDOMINAL WALL: Fat-containing inguinal hernias bilaterally right more   than left.    IMPRESSION:   Right upper lobe 2 cm sized spiculated lung mass consistent with a   primary pulmonary malignancy. A mildly enlarged right hilar node.  Enlarged prostate.  < end of copied text >    < from: CT Head No Cont (12.27.18 @ 01:04) >  IMPRESSION:   Continued evolution of acute-subacute left temporal infarct which appears   more conspicuous on current study. The overall area of involvement is   unchanged. No hemorrhage. No midline shift.    < end of copied text >    < from: CT Head No Cont (12.26.18 @ 07:55) >  IMPRESSION:      Stable moderate size acute/subacute left temporal lobe   infarct.    < end of copied text >    < from: CT Angio Neck w/ IV Cont (12.26.18 @ 01:54) >  IMPRESSION:   1. 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 There   appears to be tapering of flow proximal to the occlusion seen on series 9   images 86-88. Vertebral artery dissection should be considered. This   finding is age   indeterminate.   2. Limited visualization of a 2.0 x 1.9 cm mass lesion in right upper   lobe seen on series 6 image 179. Recommend further workup with CT of the   chest. Malignancy is in the differential diagnosis.   3. Findings as described above in the left internal carotid artery   consistent with moderate stenosis measuring 50-69%.   4. Findings as described above in right internal carotid artery   consistentwith moderate stenosis measuring 50-69%.   < end of copied text >    < from: CT Brain Stroke Protocol (12.26.18 @ 00:13) >  IMPRESSION:  No acute intracranial hemorrhage. Acute/subacute left temporal lobe   infarct. Additional age-indeterminate and chronic findings as above.    < end of copied text >    < from: US Duplex Carotid Arteries Complete, Bilateral (12.28.18 @ 16:21) >  IMPRESSION:       1.  Right carotid system:  50-69% stenosis caused by a moderate to severe   calcified atherosclerotic plaque disease within carotid bulb and distal   common carotid artery .  If carotid bruit present or further anatomical assessment required CT   angiography recommended.          2.  Left carotid system:  No hemodynamically significant stenosis is   found.        < end of copied text >    12-29    135  |  101  |  24.0<H>  ----------------------------<  126<H>  3.8   |  22.0  |  0.97    Ca    8.8      29 Dec 2018 05:57

## 2018-12-26 NOTE — H&P ADULT - PROBLEM SELECTOR PLAN 1
-tPA given 0100 12/26/18  --tPA orderset and post care protocol initiated  -on cardene will continue and titrate for -160, IVP hydralazine PRN  -Q1H neurochecks  NPO  -will need neurology consult later today  -repeat imaging in 24 hours

## 2018-12-26 NOTE — DISCHARGE NOTE ADULT - ADDITIONAL INSTRUCTIONS
Please take all medications as prescribed  Follow up with your PMD within one week  Follow up with your Neurologist within two weeks Follow up with your PMD within one week - Dr Hart called and given summary of your hospital stay   Follow up with your Neurologist within two weeks (Dr Wallace)

## 2018-12-26 NOTE — DISCHARGE NOTE ADULT - CARE PROVIDERS DIRECT ADDRESSES
,[email or web address],[email or web address] ,[email or web address],DirectAddress_Unknown

## 2018-12-26 NOTE — DISCHARGE NOTE ADULT - INSTRUCTIONS
Follow the DASH Diet: Dietary Approaches to Stop Hypertension) is a dietary pattern promoted by the U.S.-based National Heart, Lung, and Blood [employer] [part of the National Institutes of Health ("NIH"), an agency of the United States Department of Health and Human Services] to prevent and control hypertension. The DASH diet is rich in fruits, vegetables, whole grains, and low-fat dairy foods; includes meat, fish, poultry, nuts, and beans; and is limited in sugar-sweetened foods and beverages, red meat, and added fats. In addition to its effect on blood pressure, it is designed to be a well-balanced approach to eating for the general public. DASH is recommended by the United [place] Department of Agriculture ("USDA") as one of its ideal eating plans for all Americans.

## 2018-12-27 LAB
ANION GAP SERPL CALC-SCNC: 13 MMOL/L — SIGNIFICANT CHANGE UP (ref 5–17)
BUN SERPL-MCNC: 24 MG/DL — HIGH (ref 8–20)
CALCIUM SERPL-MCNC: 8.4 MG/DL — LOW (ref 8.6–10.2)
CHLORIDE SERPL-SCNC: 101 MMOL/L — SIGNIFICANT CHANGE UP (ref 98–107)
CO2 SERPL-SCNC: 23 MMOL/L — SIGNIFICANT CHANGE UP (ref 22–29)
CREAT SERPL-MCNC: 1.19 MG/DL — SIGNIFICANT CHANGE UP (ref 0.5–1.3)
GLUCOSE SERPL-MCNC: 120 MG/DL — HIGH (ref 70–115)
HCT VFR BLD CALC: 40.7 % — LOW (ref 42–52)
HGB BLD-MCNC: 13.4 G/DL — LOW (ref 14–18)
MAGNESIUM SERPL-MCNC: 2.6 MG/DL — SIGNIFICANT CHANGE UP (ref 1.6–2.6)
MCHC RBC-ENTMCNC: 30.4 PG — SIGNIFICANT CHANGE UP (ref 27–31)
MCHC RBC-ENTMCNC: 32.9 G/DL — SIGNIFICANT CHANGE UP (ref 32–36)
MCV RBC AUTO: 92.3 FL — SIGNIFICANT CHANGE UP (ref 80–94)
PHOSPHATE SERPL-MCNC: 3.6 MG/DL — SIGNIFICANT CHANGE UP (ref 2.4–4.7)
PLATELET # BLD AUTO: 161 K/UL — SIGNIFICANT CHANGE UP (ref 150–400)
POTASSIUM SERPL-MCNC: 3.6 MMOL/L — SIGNIFICANT CHANGE UP (ref 3.5–5.3)
POTASSIUM SERPL-SCNC: 3.6 MMOL/L — SIGNIFICANT CHANGE UP (ref 3.5–5.3)
RBC # BLD: 4.41 M/UL — LOW (ref 4.6–6.2)
RBC # FLD: 13.6 % — SIGNIFICANT CHANGE UP (ref 11–15.6)
SODIUM SERPL-SCNC: 137 MMOL/L — SIGNIFICANT CHANGE UP (ref 135–145)
WBC # BLD: 11.8 K/UL — HIGH (ref 4.8–10.8)
WBC # FLD AUTO: 11.8 K/UL — HIGH (ref 4.8–10.8)

## 2018-12-27 PROCEDURE — 99232 SBSQ HOSP IP/OBS MODERATE 35: CPT

## 2018-12-27 PROCEDURE — 99221 1ST HOSP IP/OBS SF/LOW 40: CPT

## 2018-12-27 PROCEDURE — 99233 SBSQ HOSP IP/OBS HIGH 50: CPT | Mod: GC

## 2018-12-27 PROCEDURE — 12345: CPT | Mod: NC,GC

## 2018-12-27 PROCEDURE — 70450 CT HEAD/BRAIN W/O DYE: CPT | Mod: 26

## 2018-12-27 RX ORDER — ONDANSETRON 8 MG/1
4 TABLET, FILM COATED ORAL ONCE
Qty: 0 | Refills: 0 | Status: COMPLETED | OUTPATIENT
Start: 2018-12-27 | End: 2018-12-27

## 2018-12-27 RX ORDER — AMLODIPINE BESYLATE 2.5 MG/1
2.5 TABLET ORAL DAILY
Qty: 0 | Refills: 0 | Status: DISCONTINUED | OUTPATIENT
Start: 2018-12-27 | End: 2018-12-28

## 2018-12-27 RX ORDER — ASPIRIN/CALCIUM CARB/MAGNESIUM 324 MG
81 TABLET ORAL DAILY
Qty: 0 | Refills: 0 | Status: DISCONTINUED | OUTPATIENT
Start: 2018-12-27 | End: 2018-12-31

## 2018-12-27 RX ORDER — HYDRALAZINE HCL 50 MG
10 TABLET ORAL EVERY 6 HOURS
Qty: 0 | Refills: 0 | Status: DISCONTINUED | OUTPATIENT
Start: 2018-12-27 | End: 2018-12-31

## 2018-12-27 RX ORDER — POTASSIUM CHLORIDE 20 MEQ
10 PACKET (EA) ORAL
Qty: 0 | Refills: 0 | Status: COMPLETED | OUTPATIENT
Start: 2018-12-27 | End: 2018-12-27

## 2018-12-27 RX ADMIN — AMLODIPINE BESYLATE 2.5 MILLIGRAM(S): 2.5 TABLET ORAL at 16:07

## 2018-12-27 RX ADMIN — FENTANYL CITRATE 25 MICROGRAM(S): 50 INJECTION INTRAVENOUS at 05:15

## 2018-12-27 RX ADMIN — Medication 81 MILLIGRAM(S): at 11:12

## 2018-12-27 RX ADMIN — ATORVASTATIN CALCIUM 80 MILLIGRAM(S): 80 TABLET, FILM COATED ORAL at 22:24

## 2018-12-27 RX ADMIN — Medication 100 MILLIEQUIVALENT(S): at 04:42

## 2018-12-27 RX ADMIN — Medication 10 MILLIGRAM(S): at 03:05

## 2018-12-27 RX ADMIN — FENTANYL CITRATE 25 MICROGRAM(S): 50 INJECTION INTRAVENOUS at 04:47

## 2018-12-27 RX ADMIN — ONDANSETRON 4 MILLIGRAM(S): 8 TABLET, FILM COATED ORAL at 04:36

## 2018-12-27 NOTE — PROGRESS NOTE ADULT - SUBJECTIVE AND OBJECTIVE BOX
[facility]                                     Neurology at [place]                                 Camilo Reed, & Cruz                                  [address]                                        [place], [place], [address]                                             [phone]    CC: stroke  HPI: The patient is a 89y Male who presented to [facility] with acute onset of aphasia and confusion.  He was evaluated by telestroke, and although an early small left temporal lobe CVA was seen, tPA was administered because his deficits were larger than what should have been caused by the stroke on CT.  He remains aphasic and history is copires from chart.  Neuro eval is requested.    Interval history: remains aphasic, repeat head CT 24 hrs post tPA did not show ICH, but did show evolving left MCA CVA. He is now on telemetry floor    ROS neurology: Aphasic    MEDICATIONS  (STANDING):  aspirin enteric coated 81 milliGRAM(s) Oral daily  atorvastatin 80 milliGRAM(s) Oral at bedtime    MEDICATIONS  (PRN):  acetaminophen   Tablet .. 650 milliGRAM(s) Oral every 6 hours PRN Mild Pain (1 - 3), Moderate Pain (4 - 6)  fentaNYL    Injectable 25 MICROGram(s) IV Push every 3 hours PRN Severe Pain (7 - 10)  hydrALAZINE Injectable 10 milliGRAM(s) IV Push every 2 hours PRN SBP >180      Vital Signs Last 24 Hrs  T(C): 36.5 (27 Dec 2018 11:00), Max: 37.4 (26 Dec 2018 17:24)  T(F): 97.7 (27 Dec 2018 11:00), Max: 99.4 (26 Dec 2018 17:24)  HR: 97 (27 Dec 2018 11:00) (69 - 97)  BP: 159/86 (27 Dec 2018 11:00) (124/60 - 196/81)  BP(mean): 109 (27 Dec 2018 05:00) (83 - 116)  RR: 16 (27 Dec 2018 11:00) (13 - 33)  SpO2: 95% (27 Dec 2018 11:00) (92% - 99%)      General: NAD    Detailed Neurologic Exam:    Mental status: The patient is awake and alert and has expressive>receptive aphasia.    Cranial nerves: Pupils equal and react symmetrically to light. There is no visual field deficit to threat. Extraocular motion is full with no nystagmus. There is no ptosis. Facial sensation is intact. Facial musculature is symmetric. Palate elevates symmetrically. Shoulder shrug is normal. Tongue is midline.    Motor: There is normal bulk and tone.  There is no tremor.  Unable to formally test power due to aphasia, but moves 4 ext well    Sensation: grimace to pin in 4 extremities    Reflexes: 1+ throughout and plantar responses are flexor.    Cerebellar: There is no dysmetria on finger to nose testing.    Gait : deferred    LABS:                                    13.4   11.8  )-----------( 161      ( 27 Dec 2018 03:21 )             40.7     12-27    137  |  101  |  24.0<H>  ----------------------------<  120<H>  3.6   |  23.0  |  1.19    Ca    8.4<L>      27 Dec 2018 03:21  Phos  3.6     12-27  Mg     2.6     12-27    TPro  6.9  /  Alb  4.1  /  TBili  0.3<L>  /  DBili  x   /  AST  18  /  ALT  14  /  AlkPhos  92  12-26    LIVER FUNCTIONS - ( 26 Dec 2018 00:23 )  Alb: 4.1 g/dL / Pro: 6.9 g/dL / ALK PHOS: 92 U/L / ALT: 14 U/L / AST: 18 U/L / GGT: x           PT/INR - ( 26 Dec 2018 00:23 )   PT: 11.1 sec;   INR: 0.97 ratio         PTT - ( 26 Dec 2018 00:23 )  PTT:30.3 sec        RADIOLOGY & ADDITIONAL STUDIES (independently reviewed unless otherwise noted):  CT head- no blood, left temporal lobe CVA, acute, no masses  CTA head/neck Right vert art occlusion, no ICA stenosis, no AVM or aneurysm.    < from: TTE Echo Complete w/Doppler (12.26.18 @ 14:51) >  Summary:   1. Normal global left ventricular systolic function.   2. Left ventricular ejection fraction, by visual estimation, is 60 to   65%.   3. Spectral Doppler shows impaired relaxation pattern of left   ventricular myocardial filling (Grade I diastolic dysfunction).   4. Normal right ventricular size and function.   5. Mildly enlarged left atrium.   6. Mildly enlarged right atrium.   7. Sclerotic aortic valve with normal opening.   8. Mild aortic regurgitation.   9. Mild tricuspid regurgitation.  10. Thickening and calcification of the anterior andposterior mitral   valve leaflets.  11. Moderate mitral annular calcification.  12. Estimated pulmonary artery systolic pressure is 41.2 mmHg assuming a   right atrial pressure of 3 mmHg, which is consistent with mild pulmonary   hypertension.  13. There is no evidence of pericardial effusion.

## 2018-12-27 NOTE — PROGRESS NOTE ADULT - ASSESSMENT
The patient is a 89y Male who is followed by neurology because of acute CVA, s/p tPA    Stroke  Acute left MCA CVA  s/p tPA  post -tPA orders completed and he is on tele now  Follow up head CT 24 hours after tPA, if no blood start ASA 81 mg daily  continue high dose statin  lipid profile  PT/OT/Speech and swallow therapy    CAD  will restart antiplatelet agent after follow up head CT shows no blood    will follow with you    Ren Page MD PhD   [id number]

## 2018-12-27 NOTE — PROGRESS NOTE ADULT - ASSESSMENT
89 y/o male with pmhx of mote (>10yrs) history of cardiac stents presents with slurred speech and forgetting family members names found to have left temporal infarct on CT head. Found to have  placed on Cardene infusion then s/p tPA at 1:09 am on 12/26. Ct angio head shows complete block of blood flow to right vertebral artery. Cardene infusion discontinued prior to CT-ICU transfer. Repeat CT after 24 hours shows no hemorrhage or worsening of infarction.    Acute left MCA CVA   -restart ASA 81 mg po qdaily beginning on 12/27  -c/w Atorvastatin 80 mg po qdaily  -neurochecks q4h    Hypertension  no hx of htn prior to admission  -Hydralazine 10 mg iv q2h PRN for SBP > 180 89 y/o male with pmhx of mote (>10yrs) history of cardiac stents presents with slurred speech and forgetting family members names found to have left temporal infarct on CT head. Found to have  placed on Cardene infusion then s/p tPA at 1:09 am on 12/26. Ct angio head shows complete block of blood flow to right vertebral artery. Cardene infusion discontinued prior to CT-ICU transfer. Repeat CT after 24 hours shows no hemorrhage or worsening of infarction.    Acute left MCA CVA   -restart ASA 81 mg po qdaily beginning on 12/27  -c/w Atorvastatin 80 mg po qdaily  -neurochecks q4h  -pain control: Tylenol prn for mild, Fentanyl 25mcg for severe  -aspiration, fall precautions    Hypertension  no hx of htn prior to admission, currently within acceptable range  -Hydralazine 10 mg iv q2h PRN for SBP > 180    Preventive measure  -dvt ppx: s/p tPA on 12/26 at 1:00am, consider restarting dvt ppx after 48 hours. 89 y/o male with pmhx of mote (>10yrs) history of cardiac stents presents with slurred speech and forgetting family members names found to have left temporal infarct on CT head. Found to have  placed on Cardene infusion then s/p tPA at 1:09 am on 12/26. Ct angio head shows complete block of blood flow to right vertebral artery. Cardene infusion discontinued prior to CT-ICU transfer. Repeat CT after 24 hours shows no hemorrhage or worsening of infarction.    Acute left MCA CVA   -restart ASA 81 mg po qdaily beginning on 12/27 if OK by neurology  -c/w Atorvastatin 80 mg po qdaily  -neurochecks q4h  -pain control: Tylenol prn for mild, Fentanyl 25mcg for severe  -aspiration, fall precautions    Hypertension  no hx of htn prior to admission, currently within acceptable range  -Hydralazine 10 mg iv q2h PRN for SBP > 180    Preventive measure  -dvt ppx: s/p tPA on 12/26 at 1:00am, consider restarting dvt ppx after 48 hours if OK by neurology 89 y/o male with pmhx of mote (>10yrs) history of cardiac stents presents with slurred speech and forgetting family members names found to have left temporal infarct on CT head. Found to have  placed on Cardene infusion then s/p tPA at 1:09 am on 12/26. Ct angio head shows complete block of blood flow to right vertebral artery. Cardene infusion discontinued prior to CT-ICU transfer. Repeat CT after 24 hours shows no hemorrhage or worsening of infarction.    Acute left MCA CVA   -restart ASA 81 mg po qdaily beginning on 12/27 if OK by neurology  -c/w Atorvastatin 80 mg po qdaily  -neurochecks q4h  -pain control: Tylenol prn for mild, Fentanyl 25mcg for severe  -aspiration, fall precautions    Hypertension  no hx of htn prior to admission, currently within acceptable range  -Hydralazine 10 mg iv q2h PRN for SBP > 180    CAD: continue statin    Preventive measure  -dvt ppx: s/p tPA on 12/26 at 1:00am, consider restarting dvt ppx after 48 hours if OK by neurology

## 2018-12-27 NOTE — CHART NOTE - NSCHARTNOTEFT_GEN_A_CORE
-This is a 89M admitted with acute L MCA CVA and L vertebral artery occlusion  -tPA given at 0100 on 12/26/18, required cardene drip  -now >24 horus post tPA, repeat head CT tis evening showed:    < from: CT Head No Cont (12.27.18 @ 01:04) >      < from: CT Head No Cont (12.27.18 @ 01:04) >    FINDINGS:    Brain:  Continued evolution of the left temporal infarct which appears   more   conspicuous on the current study. The overall area of involvement appears   unchanged. No hemorrhage. Cerebral atrophy, bilateral periventricular and   subcortical white matter low attenuation most compatible with chronic   microvascular angiopathy, right occipital encephalomalacia compatible   with   remote insult and remote bilateral lacunar infarcts without interval   change.    Ventricles: No ventriculomegaly.    Bones/joints: No acute fracture.    Sinuses: Well aerated.   Mastoid air cells: Well aerated.     Soft tissues: Unremarkable.    Vasculature:  There is atherosclerotic disease of the internal carotid   arteries bilaterally and right vertebral artery.     IMPRESSION:   1. Continued evolution of the left temporal infarct which appears more   conspicuous on the current study. The overall area of involvement appears   unchanged. No hemorrhage.   2. Other findings as above.    < end of copied text >        -as patient now > 24 hrs psot tPA with head Ct showing no new bleed patient can downgrade from ICU service  -have called and signed patient out to nocturnist, who will accept patient       -have ordered ASA 81 mg to re-start now that head CT shows no bleed as per neurology note  -continue lipitor  -PRN hydralazine for BP control  -may decrease neuro checks to Q4H      -ROS patient denies headache, changes in vision  -

## 2018-12-27 NOTE — CONSULT NOTE ADULT - SUBJECTIVE AND OBJECTIVE BOX
Vascular Attending:  Krystle Chin       HPI:  This is a 89M w/ a remote (>10yrs) history of cardiac stents, not on any blood thinners, no other medical problems. Patient arrives to [facility] ED with acute onset of word finding difficulties and not recognizing family members.   -STAT head CT done in ED showed:  < from: CT Brain Stroke Protocol (18 @ 00:13) >  IMPRESSION:  No acute intracranial hemorrhage. Acute/subacute left temporal lobe   infarct. Additional age-indeterminate and chronic findings as above.    I discussed the findings with Dr. Dominguez at 12:20 AM on 2018 with   read back verification.    < end of copied text >      -patient was then evalauted by telestroke neurologist who deemed patient candidate for tPA. However patient was noted to be hypertensive with SBP >190 (despite no PMH of HTN) he was given IVP labetalol and started on cardene drip by ER staff prior to recieiving tPA  -tPA given at 0100 on 18    -Myself and telestroke MD have explained [facility] risks and benefits of tPA including increased risk of intracranial bleeding (26 Dec 2018 01:25)      Vascular Surgery HPI   Admission HPI reviewed.  Patient admitted secondary to acute CVA, s/p TPA.  CT head reveals left temporal infarct.  Carotid duplex reveals bilateral ICA stenosis 50-69%  Patient exhibits expressive aphasia.  No reported acute motor or senosory deficits reported.  No prior history of CVA or Vascular surgical interventions.   Currently no chest pain, abdominal pain, n/v chills or fevers.       PAST MEDICAL & SURGICAL HISTORY:  No pertinent past medical history  H/O heart artery stent: x2      REVIEW OF SYSTEMS :  see hpi           MEDICATIONS  (STANDING):  amLODIPine   Tablet 2.5 milliGRAM(s) Oral daily  aspirin enteric coated 81 milliGRAM(s) Oral daily  atorvastatin 80 milliGRAM(s) Oral at bedtime    MEDICATIONS  (PRN):  acetaminophen   Tablet .. 650 milliGRAM(s) Oral every 6 hours PRN Mild Pain (1 - 3), Moderate Pain (4 - 6)  fentaNYL    Injectable 25 MICROGram(s) IV Push every 3 hours PRN Severe Pain (7 - 10)  hydrALAZINE Injectable 10 milliGRAM(s) IV Push every 6 hours PRN sbp > 180 or dbp > 100      Allergies    sulfa drugs (Short breath)    Intolerances    no seeds or spicy foods (Unknown)  pt likes chicken, rice, pasta, vegetables (bland diet) (Unknown)      SOCIAL HISTORY: non contributory       Vital Signs Last 24 Hrs  T(C): 36.6 (27 Dec 2018 15:40), Max: 37.4 (26 Dec 2018 17:24)  T(F): 97.8 (27 Dec 2018 15:40), Max: 99.4 (26 Dec 2018 17:24)  HR: 80 (27 Dec 2018 16:00) (69 - 97)  BP: 178/90 (27 Dec 2018 15:40) (124/60 - 196/81)  BP(mean): 109 (27 Dec 2018 05:00) (87 - 116)  RR: 16 (27 Dec 2018 16:00) (13 - 33)  SpO2: 94% (27 Dec 2018 16:00) (92% - 97%)    PHYSICAL EXAM:      Constitutional: alert, no distress.  difficulty with verbal expressions    Eyes: no jaundice, occular rom intact     ENMT: atraumatic     Neck: no audible bruits       Respiratory: non labored breathing     Cardiovascular:  s1/s2     Gastrointestinal: soft non tender, no pulsatile mass       Extremities: warm, moving all extremities on command     Vascular: 2+ femorals     neurological; No gross motor or sensory deficits encountered       Psychiatric: good affect         LABS:  < from: CT Angio Neck w/ IV Cont (12.26.18 @ 01:54) >     EXAM:  CT ANGIO NECK (W)AW IC                         EXAM:  CT ANGIO BRAIN (W)AW IC                          PROCEDURE DATE:  2018          INTERPRETATION:    EXAM:  CT Angiography Head With Contrast     CLINICAL HISTORY:  89 years old, male; Signs and symptoms; Speech   disturbance; Type not specified     TECHNIQUE:  Axial computed tomographic angiography images of the head   with intravenous   contrast using CT angiography protocol.     COMPARISON:  No relevant prior studies available.     FINDINGS:      Right internal carotid artery: Unremarkable. Intracranial segment is   patent with no significant stenosis. No aneurysm.    Right anterior cerebral artery: Unremarkable. No occlusion or   significant stenosis. No aneurysm.     Right middle cerebral artery: Unremarkable. No occlusion or significant   stenosis. No aneurysm.     Right posterior cerebral artery: Unremarkable. No occlusion or   significant stenosis. No aneurysm.     Right vertebral artery:  There is complete lack of flow in the   intracranial portion of right vertebral artery.      Left internal carotid artery: Unremarkable. Intracranial segment is   patent with no significant stenosis. No aneurysm.    Left anterior cerebral artery: Unremarkable. No occlusion or significant   stenosis. No aneurysm.     Left middle cerebral artery: Unremarkable. No occlusion or significant   stenosis. No aneurysm.     Left posterior cerebral artery: Unremarkable. No occlusion or   significant stenosis. No aneurysm.     Left vertebral artery: Unremarkable. No occlusion or significant   stenosis. No aneurysm.       Basilar artery: Unremarkable. No occlusion or significant stenosis. No   aneurysm.    Other vasculature:  Atherosclerotic calculations with a similar stenosis   and bilateral distal ICA. No evidence of occlusion.       IMPRESSION:   There is complete lack of flow in the intracranial portion of right   vertebral artery.     ===========================================================================  ======    EXAM:  CT Angiography Neck With Contrast     CLINICAL HISTORY:  89 years old, male; Signs and symptoms; Speech   disturbance; Type not specified     TECHNIQUE:  Axial computed tomographic angiography images of the neck   with intravenous contrast using CT angiography protocol.  Coronal and   sagittal reformatted images were created and reviewed.  MIP reconstructed   images were created and reviewed.     CONTRAST:  95 ml of omni 350 administered intravenously.     COMPARISON:  No relevant prior studies available.     FINDINGS:     VASCULATURE:    Right common carotid artery: Normal. No significant stenosis. No   dissection or occlusion.    Right internal carotid artery:  Atherosclerotic disease in proximal   right ICA with areas of stenosis. ICA lumen at the level of stenosis   measures 2.4 mm .ICA lumen distally measures 5.0 mm    Right external carotid artery: Normal. No occlusion or significant   stenosis.    Right vertebral artery:  Complete occlusion of right vertebral artery at   C1 level. There appears to be tapering of flow proximal to the occlusion   seen on series 9 images 86-88.      Left common carotid artery: Normal. No significant stenosis. No   dissection or occlusion.    Left internal carotid artery:  Atherosclerotic disease with areas of   stenosis in proximal left ICA. ICA lumen at the level of stenosis   measures 2.2 mm .ICA lumen distally measures 5.7 mm .    Left external carotid artery: Normal. No occlusion or significant   stenosis.    Left vertebral artery: Normal. No significant stenosis. No dissection or   occlusion.       NECK:    Bones/joints:  Degenerative changes in the spine. No evidence of   fracture.    Soft tissues: Normal. No significant soft tissue swelling.    Lung apices:  Limited visualization of a 2.0 x 1.9 cm mass lesion in   right upper lobe seen on series 6 image 179.     IMPRESSION:   1. 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 There   appears to be tapering of flow proximal to the occlusion seen on series 9   images 86-88. Vertebral artery dissection should be considered. This   finding is age   indeterminate.   2. Limited visualization of a 2.0 x 1.9 cm mass lesion in right upper   lobe seen on series 6 image 179. Recommend further workup with CT of the   chest. Malignancy is in the differential diagnosis.   3. Findings as described above in the left internal carotid artery   consistent with moderate stenosis measuring 50-69%.   4. Findings as described above in right internal carotid artery   consistentwith moderate stenosis measuring 50-69%.     Preliminary report provided by ETHEL FLORIAN;[facility] RADIOLOGIST who   discussed these critical findings within the report were with Jethro CARRENO  at 2018 2:43 AM NUHA VELAZQUEZ M.D., ATTENDING RADIOLOGIST  This document has been electronically signed. Dec 26 2018  8:39AM                  < end of copied text >                        13.4   11.8  )-----------( 161      ( 27 Dec 2018 03:21 )             40.7         137  |  101  |  24.0<H>  ----------------------------<  120<H>  3.6   |  23.0  |  1.19    Ca    8.4<L>      27 Dec 2018 03:21  Phos  3.6       Mg     2.6         TPro  6.9  /  Alb  4.1  /  TBili  0.3<L>  /  DBili  x   /  AST  18  /  ALT  14  /  AlkPhos  92      PT/INR - ( 26 Dec 2018 00:23 )   PT: 11.1 sec;   INR: 0.97 ratio         PTT - ( 26 Dec 2018 00:23 )  PTT:30.3 sec  Urinalysis Basic - ( 26 Dec 2018 00:57 )    Color: Yellow / Appearance: Clear / S.010 / pH: x  Gluc: x / Ketone: Negative  / Bili: Negative / Urobili: Negative mg/dL   Blood: x / Protein: 30 mg/dL / Nitrite: Negative   Leuk Esterase: Negative / RBC: 0-2 /HPF / WBC 0-2   Sq Epi: x / Non Sq Epi: Occasional / Bacteria: Occasional                Impression and Plan:    Acute left temporal infarct  bilateral ICA moderate stenosis     - patient ICA stenosis (left ICA) deemed symptomatic in the setting of acute left temporal cva  - will need CAROTID DUPLEX for a better assessment for the degree of stenosis   - will forward above findings to the covering Vascular Surgical Attending.  - please provide medical/cardiac clearance for the likely possibility for surgical intervention (left carotid endarterectomy)  - will follow up neurology's recommendation

## 2018-12-27 NOTE — PROGRESS NOTE ADULT - SUBJECTIVE AND OBJECTIVE BOX
CC: Patient is a 89y old  Male who presents with a chief complaint of word finding difficulties/not recognizing family (26 Dec 2018 14:26)    Hospital course:   89 y/o male with pmhx of mote (>10yrs) history of cardiac stents presents with slurred speech and forgetting family members names found to have left temporal infarct on CT head. Found to have  placed on Cardene infusion then s/p tPA at 1:09 am on 12/26. Ct angio head shows complete block of blood flow to right vertebral artery. Cardene infusion discontinued prior to CT-ICU transfer. Repeat CT after 24 hours shows no hemorrhage or worsening of infarction.     Patient seen and examined at bedside. Continues to have difficulty finding words. Follows commands intermittently.     ROS: Unable to assess due to fluent aphasia.     VS:   Vital Signs Last 24 Hrs  T(C): 36.8 (27 Dec 2018 01:06), Max: 37.4 (26 Dec 2018 17:24)  T(F): 98.3 (27 Dec 2018 01:06), Max: 99.4 (26 Dec 2018 17:24)  HR: 80 (27 Dec 2018 03:24) (69 - 90)  BP: 124/60 (27 Dec 2018 03:24) (114/68 - 196/81)  BP(mean): 87 (27 Dec 2018 03:24) (83 - 116)  RR: 30 (27 Dec 2018 03:24) (11 - 38)  SpO2: 94% (27 Dec 2018 03:24) (92% - 99%)    Physical Exam:   Gen: NAD  HEENT: NCAT, EOMI  CVS: RRR, +S1/S2, no murmurs, rubs or gallops appreciated  Lungs: CTAB, no wheeze, rales, rhonchi  Abdomen: +BS, soft, ND, NT   Ext: No cyanosis, edema or calf tenderness  Neuro: AAOx3, Motor strength 5/5 in UE, 4/5 in LE, fluent aphasia noted.     Labs:                        13.8   7.1   )-----------( 174      ( 26 Dec 2018 00:23 )             41.5   12-26    141  |  104  |  26.0<H>  ----------------------------<  96  4.0   |  28.0  |  1.23    Ca    8.5<L>      26 Dec 2018 00:23    TPro  6.9  /  Alb  4.1  /  TBili  0.3<L>  /  DBili  x   /  AST  18  /  ALT  14  /  AlkPhos  92  12-26 12-25 @ 07:01  -  12-26 @ 07:00  --------------------------------------------------------  IN: 45 mL / OUT: 1100 mL / NET: -1055 mL    12-26 @ 07:01  -  12-27 @ 03:26  --------------------------------------------------------  IN: 220 mL / OUT: 740 mL / NET: -520 mL        Radiology:  < from: CT Head No Cont (12.27.18 @ 01:04) >  IMPRESSION:   1. Continued evolution of the left temporal infarct which appears more   conspicuous on the current study. The overall area of involvement appears   unchanged. No hemorrhage.   2. Other findings as above.    < from: CT Head No Cont (12.26.18 @ 07:55) >  IMPRESSION:      Stable moderate size acute/subacute left temporal lobe   infarct.    Medications:  MEDICATIONS  (STANDING):  aspirin enteric coated 81 milliGRAM(s) Oral daily  atorvastatin 80 milliGRAM(s) Oral at bedtime    MEDICATIONS  (PRN):  acetaminophen   Tablet .. 650 milliGRAM(s) Oral every 6 hours PRN Mild Pain (1 - 3), Moderate Pain (4 - 6)  fentaNYL    Injectable 25 MICROGram(s) IV Push every 3 hours PRN Severe Pain (7 - 10)  hydrALAZINE Injectable 10 milliGRAM(s) IV Push every 2 hours PRN SBP >180 CC: Patient is a 89y old  Male who presents with a chief complaint of word finding difficulties/not recognizing family (26 Dec 2018 14:26)    Patient seen and examined at bedside. Continues to have difficulty finding words. Follows commands intermittently.     ROS: Unable to assess due to fluent aphasia.     VS:   Vital Signs Last 24 Hrs  T(C): 36.8 (27 Dec 2018 01:06), Max: 37.4 (26 Dec 2018 17:24)  T(F): 98.3 (27 Dec 2018 01:06), Max: 99.4 (26 Dec 2018 17:24)  HR: 80 (27 Dec 2018 03:24) (69 - 90)  BP: 124/60 (27 Dec 2018 03:24) (114/68 - 196/81)  BP(mean): 87 (27 Dec 2018 03:24) (83 - 116)  RR: 30 (27 Dec 2018 03:24) (11 - 38)  SpO2: 94% (27 Dec 2018 03:24) (92% - 99%)    Physical Exam:   Gen: NAD  HEENT: NCAT, EOMI  CVS: RRR, +S1/S2, no murmurs, rubs or gallops appreciated  Lungs: CTAB, no wheeze, rales, rhonchi  Abdomen: +BS, soft, ND, NT   Ext: No cyanosis, edema or calf tenderness  Neuro: AAOx3, Motor strength 5/5 in UE, 4/5 in LE, fluent aphasia noted.     Labs:                        13.8   7.1   )-----------( 174      ( 26 Dec 2018 00:23 )             41.5   12-26    141  |  104  |  26.0<H>  ----------------------------<  96  4.0   |  28.0  |  1.23    Ca    8.5<L>      26 Dec 2018 00:23    TPro  6.9  /  Alb  4.1  /  TBili  0.3<L>  /  DBili  x   /  AST  18  /  ALT  14  /  AlkPhos  92  12-26 12-25 @ 07:01  -  12-26 @ 07:00  --------------------------------------------------------  IN: 45 mL / OUT: 1100 mL / NET: -1055 mL    12-26 @ 07:01  -  12-27 @ 03:26  --------------------------------------------------------  IN: 220 mL / OUT: 740 mL / NET: -520 mL        Radiology:  < from: CT Head No Cont (12.27.18 @ 01:04) >  IMPRESSION:   1. Continued evolution of the left temporal infarct which appears more   conspicuous on the current study. The overall area of involvement appears   unchanged. No hemorrhage.   2. Other findings as above.    < from: CT Head No Cont (12.26.18 @ 07:55) >  IMPRESSION:      Stable moderate size acute/subacute left temporal lobe   infarct.    Medications:  MEDICATIONS  (STANDING):  aspirin enteric coated 81 milliGRAM(s) Oral daily  atorvastatin 80 milliGRAM(s) Oral at bedtime    MEDICATIONS  (PRN):  acetaminophen   Tablet .. 650 milliGRAM(s) Oral every 6 hours PRN Mild Pain (1 - 3), Moderate Pain (4 - 6)  fentaNYL    Injectable 25 MICROGram(s) IV Push every 3 hours PRN Severe Pain (7 - 10)  hydrALAZINE Injectable 10 milliGRAM(s) IV Push every 2 hours PRN SBP >180 CC: Patient is a 89y old  Male who presents with a chief complaint of word finding difficulties/not recognizing family (26 Dec 2018 14:26)    Patient seen and examined at bedside. Continues to have difficulty finding words. Follows commands intermittently.     ROS: Unable to assess due to fluent aphasia.     VS:   Vital Signs Last 24 Hrs  T(C): 36.8 (27 Dec 2018 01:06), Max: 37.4 (26 Dec 2018 17:24)  T(F): 98.3 (27 Dec 2018 01:06), Max: 99.4 (26 Dec 2018 17:24)  HR: 80 (27 Dec 2018 03:24) (69 - 90)  BP: 124/60 (27 Dec 2018 03:24) (114/68 - 196/81)  BP(mean): 87 (27 Dec 2018 03:24) (83 - 116)  RR: 30 (27 Dec 2018 03:24) (11 - 38)  SpO2: 94% (27 Dec 2018 03:24) (92% - 99%)    Physical Exam:   T(C): 36.8 (12-27-18 @ 01:06), Max: 37.4 (12-26-18 @ 17:24)  HR: 92 (12-27-18 @ 04:00) (69 - 92)  BP: 158/70 (12-27-18 @ 04:00) (114/68 - 196/81)  RR: 21 (12-27-18 @ 04:00) (11 - 34)  SpO2: 95% (12-27-18 @ 04:00) (92% - 99%)  Wt(kg): --    Physical Exam:   GENERAL: well-groomed, well-developed, NAD  HEENT: head NC/AT; EOM intact, PERRLA, conjunctiva & sclera clear; hearing grossly intact, moist mucous membranes  NECK: supple, no JVD  RESPIRATORY: CTA B/L, no wheezing, rales, rhonchi or rubs  CARDIOVASCULAR: S1&S2, RRR, no murmurs or gallops  ABDOMEN: soft, non-tender, non-distended, + Bowel sounds x4 quadrants, no guarding, rebound or rigidity  MUSCULOSKELETAL:  no clubbing, cyanosis or edema of all 4 extremities  LYMPH: no cervical lymphadenopathy  VASCULAR: Radial pulses 2+ bilaterally, no varicose veins   SKIN: warm and dry, color normal  NEUROLOGIC: AA&O X3, CN2-12 intact w/ no focal deficits , follows commands with prompting, no sensory loss, motor Strength 5/5 in UE & LE B/L  Psych: Normal mood and affect, normal behavior        Labs:                        13.8   7.1   )-----------( 174      ( 26 Dec 2018 00:23 )             41.5   12-26    141  |  104  |  26.0<H>  ----------------------------<  96  4.0   |  28.0  |  1.23    Ca    8.5<L>      26 Dec 2018 00:23    TPro  6.9  /  Alb  4.1  /  TBili  0.3<L>  /  DBili  x   /  AST  18  /  ALT  14  /  AlkPhos  92  12-26 12-25 @ 07:01  -  12-26 @ 07:00  --------------------------------------------------------  IN: 45 mL / OUT: 1100 mL / NET: -1055 mL    12-26 @ 07:01  -  12-27 @ 03:26  --------------------------------------------------------  IN: 220 mL / OUT: 740 mL / NET: -520 mL        Radiology:  < from: CT Head No Cont (12.27.18 @ 01:04) >  IMPRESSION:   1. Continued evolution of the left temporal infarct which appears more   conspicuous on the current study. The overall area of involvement appears   unchanged. No hemorrhage.   2. Other findings as above.    < from: CT Head No Cont (12.26.18 @ 07:55) >  IMPRESSION:      Stable moderate size acute/subacute left temporal lobe   infarct.    Medications:  MEDICATIONS  (STANDING):  aspirin enteric coated 81 milliGRAM(s) Oral daily  atorvastatin 80 milliGRAM(s) Oral at bedtime    MEDICATIONS  (PRN):  acetaminophen   Tablet .. 650 milliGRAM(s) Oral every 6 hours PRN Mild Pain (1 - 3), Moderate Pain (4 - 6)  fentaNYL    Injectable 25 MICROGram(s) IV Push every 3 hours PRN Severe Pain (7 - 10)  hydrALAZINE Injectable 10 milliGRAM(s) IV Push every 2 hours PRN SBP >180

## 2018-12-27 NOTE — PHYSICAL THERAPY INITIAL EVALUATION ADULT - ADDITIONAL COMMENTS
pt more than 24hrs s/p tPA. Pt reporting living with his GF but states his son may be able to stay with him if he returns home. Per notes pt has 8 steps and single hand rail.

## 2018-12-27 NOTE — PROGRESS NOTE ADULT - PROBLEM SELECTOR PLAN 1
-tPA given 0100 12/26/18  --tPA orderset and post care protocol initiated  -on cardene will continue and titrate for -160, IVP hydralazine PRN  -Q1H neurochecks  NPO  -will need neurology consult later today  -repeat imaging in 24 hours See plan above

## 2018-12-27 NOTE — PHYSICAL THERAPY INITIAL EVALUATION ADULT - FOLLOWS COMMANDS/ANSWERS QUESTIONS, REHAB EVAL
pt following commands, confused on occasions. When asked to remove sock, pt attempts to stand./75% of the time

## 2018-12-28 LAB
ANION GAP SERPL CALC-SCNC: 9 MMOL/L — SIGNIFICANT CHANGE UP (ref 5–17)
BASOPHILS # BLD AUTO: 0 K/UL — SIGNIFICANT CHANGE UP (ref 0–0.2)
BASOPHILS NFR BLD AUTO: 0.3 % — SIGNIFICANT CHANGE UP (ref 0–2)
BUN SERPL-MCNC: 23 MG/DL — HIGH (ref 8–20)
CALCIUM SERPL-MCNC: 8.3 MG/DL — LOW (ref 8.6–10.2)
CHLORIDE SERPL-SCNC: 103 MMOL/L — SIGNIFICANT CHANGE UP (ref 98–107)
CHOLEST SERPL-MCNC: 236 MG/DL — HIGH (ref 110–199)
CO2 SERPL-SCNC: 26 MMOL/L — SIGNIFICANT CHANGE UP (ref 22–29)
CREAT SERPL-MCNC: 0.98 MG/DL — SIGNIFICANT CHANGE UP (ref 0.5–1.3)
EOSINOPHIL # BLD AUTO: 0.1 K/UL — SIGNIFICANT CHANGE UP (ref 0–0.5)
EOSINOPHIL NFR BLD AUTO: 1 % — SIGNIFICANT CHANGE UP (ref 0–5)
GLUCOSE SERPL-MCNC: 106 MG/DL — SIGNIFICANT CHANGE UP (ref 70–115)
HBA1C BLD-MCNC: 5.4 % — SIGNIFICANT CHANGE UP (ref 4–5.6)
HCT VFR BLD CALC: 40.5 % — LOW (ref 42–52)
HDLC SERPL-MCNC: 70 MG/DL — SIGNIFICANT CHANGE UP
HGB BLD-MCNC: 13.6 G/DL — LOW (ref 14–18)
LIPID PNL WITH DIRECT LDL SERPL: 144 MG/DL — SIGNIFICANT CHANGE UP
LYMPHOCYTES # BLD AUTO: 1.3 K/UL — SIGNIFICANT CHANGE UP (ref 1–4.8)
LYMPHOCYTES # BLD AUTO: 12.7 % — LOW (ref 20–55)
MAGNESIUM SERPL-MCNC: 2.3 MG/DL — SIGNIFICANT CHANGE UP (ref 1.8–2.6)
MCHC RBC-ENTMCNC: 31.1 PG — HIGH (ref 27–31)
MCHC RBC-ENTMCNC: 33.6 G/DL — SIGNIFICANT CHANGE UP (ref 32–36)
MCV RBC AUTO: 92.7 FL — SIGNIFICANT CHANGE UP (ref 80–94)
MONOCYTES # BLD AUTO: 0.7 K/UL — SIGNIFICANT CHANGE UP (ref 0–0.8)
MONOCYTES NFR BLD AUTO: 6.8 % — SIGNIFICANT CHANGE UP (ref 3–10)
NEUTROPHILS # BLD AUTO: 8.1 K/UL — HIGH (ref 1.8–8)
NEUTROPHILS NFR BLD AUTO: 79 % — HIGH (ref 37–73)
PHOSPHATE SERPL-MCNC: 2.5 MG/DL — SIGNIFICANT CHANGE UP (ref 2.4–4.7)
PLATELET # BLD AUTO: 141 K/UL — LOW (ref 150–400)
POTASSIUM SERPL-MCNC: 3.8 MMOL/L — SIGNIFICANT CHANGE UP (ref 3.5–5.3)
POTASSIUM SERPL-SCNC: 3.8 MMOL/L — SIGNIFICANT CHANGE UP (ref 3.5–5.3)
RBC # BLD: 4.37 M/UL — LOW (ref 4.6–6.2)
RBC # FLD: 13.7 % — SIGNIFICANT CHANGE UP (ref 11–15.6)
SODIUM SERPL-SCNC: 138 MMOL/L — SIGNIFICANT CHANGE UP (ref 135–145)
TOTAL CHOLESTEROL/HDL RATIO MEASUREMENT: 3 RATIO — LOW (ref 3.4–9.6)
TRIGL SERPL-MCNC: 110 MG/DL — SIGNIFICANT CHANGE UP (ref 10–200)
WBC # BLD: 10.2 K/UL — SIGNIFICANT CHANGE UP (ref 4.8–10.8)
WBC # FLD AUTO: 10.2 K/UL — SIGNIFICANT CHANGE UP (ref 4.8–10.8)

## 2018-12-28 PROCEDURE — 99232 SBSQ HOSP IP/OBS MODERATE 35: CPT

## 2018-12-28 PROCEDURE — 71260 CT THORAX DX C+: CPT | Mod: 26

## 2018-12-28 PROCEDURE — 99233 SBSQ HOSP IP/OBS HIGH 50: CPT | Mod: GC

## 2018-12-28 PROCEDURE — 93880 EXTRACRANIAL BILAT STUDY: CPT | Mod: 26

## 2018-12-28 PROCEDURE — 74177 CT ABD & PELVIS W/CONTRAST: CPT | Mod: 26

## 2018-12-28 PROCEDURE — 99222 1ST HOSP IP/OBS MODERATE 55: CPT

## 2018-12-28 RX ORDER — AMLODIPINE BESYLATE 2.5 MG/1
2.5 TABLET ORAL ONCE
Qty: 0 | Refills: 0 | Status: COMPLETED | OUTPATIENT
Start: 2018-12-28 | End: 2018-12-28

## 2018-12-28 RX ORDER — AMLODIPINE BESYLATE 2.5 MG/1
5 TABLET ORAL DAILY
Qty: 0 | Refills: 0 | Status: DISCONTINUED | OUTPATIENT
Start: 2018-12-29 | End: 2018-12-31

## 2018-12-28 RX ADMIN — Medication 10 MILLIGRAM(S): at 21:05

## 2018-12-28 RX ADMIN — ATORVASTATIN CALCIUM 80 MILLIGRAM(S): 80 TABLET, FILM COATED ORAL at 21:05

## 2018-12-28 RX ADMIN — AMLODIPINE BESYLATE 2.5 MILLIGRAM(S): 2.5 TABLET ORAL at 05:33

## 2018-12-28 RX ADMIN — Medication 81 MILLIGRAM(S): at 10:20

## 2018-12-28 RX ADMIN — AMLODIPINE BESYLATE 2.5 MILLIGRAM(S): 2.5 TABLET ORAL at 10:20

## 2018-12-28 NOTE — OCCUPATIONAL THERAPY INITIAL EVALUATION ADULT - MANUAL MUSCLE TESTING RESULTS, REHAB EVAL
right shoulder grossly assessed with AROM against gravity 3/5, right elbow grossly assessed with AROM against gravity 3/5, right gross grasp 5/5; left shoulder grossly assessed with partial AROM against gravity 2/5, left elbow grossly assessed with AROM against gravity 3/5, left gross grasp 4/5

## 2018-12-28 NOTE — SPEECH LANGUAGE PATHOLOGY EVALUATION - SLP DIAGNOSIS
Moderate expressive and receptive aphasia marked by impaired ability to express complete ideas which is futher compacted by impaired receptive language deficits.

## 2018-12-28 NOTE — PROGRESS NOTE ADULT - ASSESSMENT
89y Male who is followed by neurology because of acute CVA, s/p tPA    Stroke  Acute left MCA CVA  s/p tPA  Follow up head CT 24 hours after tPA no blood. ASA 81 mg daily started.  Continue high dose statin  Rehab eval appreciated.     Carotid stenosis.  50-69% bilaterally by CT-A.  Awaiting carotid duplex.   Vascular eval appreciated.     Possible Lung mass.  Further work up per medicine.     Case discussed with Dr Ariza.

## 2018-12-28 NOTE — PROGRESS NOTE ADULT - SUBJECTIVE AND OBJECTIVE BOX
[facility]                                        [facility]                                 Camilo Reed, & Cruz                                  [address]                                        [place], [place], [address]                                             [phone]        CC: Stroke    HPI:   The patient is a 89y Male who presented to [facility] with acute onset of aphasia and confusion.  He was evaluated by tele-stroke, and although an early small left temporal lobe CVA was seen, t-PA was administered because his deficits were larger than what should have been caused by the stroke on CT.      Interim history:  Remains aphasic.     ROS:   Denies headache or dizziness.  Denies chest pain.  Denies shortness of breath.    MEDICATIONS  (STANDING):  aspirin enteric coated 81 milliGRAM(s) Oral daily  atorvastatin 80 milliGRAM(s) Oral at bedtime      Vital Signs Last 24 Hrs  T(C): 36.6 (28 Dec 2018 10:15), Max: 36.9 (27 Dec 2018 22:21)  T(F): 97.9 (28 Dec 2018 10:15), Max: 98.5 (27 Dec 2018 22:21)  HR: 80 (28 Dec 2018 10:15) (77 - 97)  BP: 172/70 (28 Dec 2018 10:15) (154/78 - 178/90)  RR: 19 (28 Dec 2018 10:15) (16 - 19)  SpO2: 95% (28 Dec 2018 10:15) (91% - 97%)    Detailed Neurologic Exam:    Mental status: The patient is awake and alert and has expressive>receptive aphasia.    Cranial nerves: Pupils equal and react symmetrically to light. There is no visual field deficit to threat. Extraocular motion is full with no nystagmus. There is no ptosis. Facial sensation is intact. Facial musculature is symmetric. Palate elevates symmetrically. Shoulder shrug is normal. Tongue is midline.    Motor: There is normal bulk and tone.  There is no tremor.  Unable to formally test power due to aphasia, but moves 4 ext well with grossly 5/5 power.    Sensation: Grimace to pin in 4 extremities    Reflexes: 1+ throughout and plantar responses are flexor.    Cerebellar: Difficult to assess although grossly there is no dysmetria on finger to nose testing.      Labs:     12-28    138  |  103  |  23.0<H>  ----------------------------<  106  3.8   |  26.0  |  0.98    Ca    8.3<L>      28 Dec 2018 06:04  Phos  2.5     12-28  Mg     2.3     12-28                              13.6   10.2  )-----------( 141      ( 28 Dec 2018 06:04 )             40.5

## 2018-12-28 NOTE — OCCUPATIONAL THERAPY INITIAL EVALUATION ADULT - COORDINATION ASSESSED, REHAB EVAL
increase time and cues as pt with difficulty following commands of assessment due to decreased cognition/finger to nose/heel to shin

## 2018-12-28 NOTE — OCCUPATIONAL THERAPY INITIAL EVALUATION ADULT - PERTINENT HX OF CURRENT PROBLEM, REHAB EVAL
Pt presents to ED with confusion. Brain CT reveals no acute intracranial hemorrhage; acute/subacute left temporal lobe infarct; additional age-indeterminate and chronic findings as described in results.

## 2018-12-28 NOTE — SPEECH LANGUAGE PATHOLOGY EVALUATION - SLP BEHAVIORAL OBSERVATIONS
Patient tolerated chemotherapy  Denies any discomforts  Aware of next appointment   AVS given to patient within functional limits

## 2018-12-28 NOTE — CHART NOTE - NSCHARTNOTEFT_GEN_A_CORE
Patient seen and examined at the bedside with resident team. I was physically present for the key portions of the evaluation and management services provided.  history, physical, assessment, and plan per their note dated for today with the necessary amendments/elaborations below:    clinically stable. expressive + receptive aphasia remains, slightly improved from prior exam but certainly still prominent, as the pt only remaining deficit from acute cva. slp eval appreciated, they will continue to follow.     regarding abn findings on angio of the neck, pt w/ poss vertebral dissection, not re-demonstrated on carotid us. also with suggestion of bl carotid stenosis 50-69% on angio, per carotid us done today, stenosis present only on the rt side. vasc eval appreciated, they were considering Lt sided CEA given acute cva however carotid us and MR with conflicting findings, will need to follow with them regarding plan. at this point however, pt was incidentally noted on prior exam to have suspected lung mass. 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 given these findings, will need to have extensive discussion with family who are now making med decisions for pt regarding plan of care. given his advanced age and now cva, decision for aggressive treatment in the setting of what appears to be underlying malignancy at least x1 and poss x2, aggressive measures for risk factor reduction for cva which may include sx may not be as indicated as previously thought.     will follow closely.

## 2018-12-28 NOTE — SPEECH LANGUAGE PATHOLOGY EVALUATION - SLP CONVERSATIONAL SPEECH
+word finding difficulties, accompanied with neologisms, at times uses hand gestures to compensate for naming deficits,

## 2018-12-28 NOTE — OCCUPATIONAL THERAPY INITIAL EVALUATION ADULT - PERSONAL SAFETY AND JUDGMENT, REHAB EVAL
no unsafe behaviors demonstrated during evaluation however pt with confusion and diffculty following commands at times/impaired

## 2018-12-28 NOTE — OCCUPATIONAL THERAPY INITIAL EVALUATION ADULT - RANGE OF MOTION EXAMINATION, UPPER EXTREMITY
left shoulder about 30 degrees AROM (AAROM/PROM WFL) pt reports shoulder has been limited for years, left elbow AROM WFL, left gross grasp and digit extension AROM WFL/Right UE Active ROM was WFL (within functional limits)

## 2018-12-28 NOTE — OCCUPATIONAL THERAPY INITIAL EVALUATION ADULT - NS ASR FOLLOW COMMAND OT EVAL
pt requires increased time, repetition and cues to process commands of tasks; pt with decreased sustained and selective attention requiring cues to redirect with and without external distractions; pt with decreased problem solving and sequencing skills requiring cues for tasks/mobility/75% of the time/able to follow single-step instructions/unable to follow multi-step instructions

## 2018-12-28 NOTE — OCCUPATIONAL THERAPY INITIAL EVALUATION ADULT - ADDITIONAL COMMENTS
Pt unable to provide information on prior level of function, social history, etc due to aphasia and confusion. Pt did speak of having a daughter and a son but not in context of questions asked.

## 2018-12-28 NOTE — SPEECH LANGUAGE PATHOLOGY EVALUATION - SLP VERBAL EXPRESSION
+word finding difficulties, difficult for pt to express complete ideas, however able to express basic needs through yes/no questions/impaired

## 2018-12-28 NOTE — PROGRESS NOTE ADULT - ASSESSMENT
89 y/o male with pmhx of remote (>10yrs) history of cardiac stents presents with slurred speech and forgetting family members names found to have left temporal infarct on CT head. Found to have  placed on Cardene infusion then s/p tPA at 1:09 am on 12/26. Ct angio head shows complete block of blood flow to right vertebral artery. Cardene infusion discontinued prior to CT-ICU transfer. Repeat CT after 24 hours shows no hemorrhage or worsening of infarction.    Acute left MCA CVA   -c/w ASA 81 mg po qdaily, will start Plavix 75mg PO daily  -c/w Atorvastatin 80 mg po qdaily  -neurochecks q4h  -pain control: Tylenol prn for mild, Fentanyl 25mcg for severe  -aspiration, fall precautions    Hypertension  - Uncontrolled at this time  - no hx of htn prior to admission, currently within acceptable range  - start Norvasc 5 mg PO daily  - Hydralazine 10 mg iv q2h PRN for SBP > 180    Right carotid artery stenosis  - 50-69% stenosis caused by a moderate to severe calcified atherosclerotic plaque disease within carotid bulb and distal common carotid artery  Vascular consult appreciated  - patient being planned for possible OR for carotid endarterectomy (CEA)  - Patient daughter called and updated on possibility of CEA and need for Cardiology consult for clearance    Lung Mass  - Consent to CT chest with contrast obtained over the phone with daughter  - CT chest showing Right upper lobe 2 cm sized spiculated lung mass consistent with a primary pulmonary malignancy   - Also notable for 4 mm sized low density in the head of the pancreas, it may represent an IPMN  - Team will discuss results if CT chest with family in AM and consider further work-up at that time    CAD  - continue statin + ASA    Preventive measure  -dvt ppx: s/p tPA on 12/26 at 1:00am, on DAPT as well as SCD boots at this time.

## 2018-12-28 NOTE — OCCUPATIONAL THERAPY INITIAL EVALUATION ADULT - ORIENTATION, REHAB EVAL
place/pt able to identify name, but not date of birth; pt able to identify place as "hospital" when given choices; pt unable to identify time despite choices/cues/person

## 2018-12-28 NOTE — PROGRESS NOTE ADULT - SUBJECTIVE AND OBJECTIVE BOX
CC: Patient is a 89y old  Male who presents with a chief complaint of word finding difficulties/not recognizing family (28 Dec 2018 11:02)    Patient seen and examined at bedside, No acute overnight events. Patient this AM without any complaints.  Patient minimally ambulating with assistance eating well, voiding and last BM yesterday.    ROS: Denies fever, chest pain, SOB, abdominal pain, diarrhea, constipation, calf pain.    VS:   Vital Signs Last 24 Hrs  T(C): 36.9 (28 Dec 2018 17:11), Max: 36.9 (27 Dec 2018 22:21)  T(F): 98.4 (28 Dec 2018 17:11), Max: 98.5 (27 Dec 2018 22:21)  HR: 80 (28 Dec 2018 17:11) (77 - 85)  BP: 180/90 (28 Dec 2018 17:11) (154/78 - 180/90)  RR: 18 (28 Dec 2018 17:11) (18 - 19)  SpO2: 96% (28 Dec 2018 17:11) (91% - 96%)    Physical Exam:   Gen: NAD  HEENT: NCAT, EOMI, PERRLA  CVS: RRR, +S1/S2, no murmurs, rubs or gallops appreciated  Lungs: CTAB, no wheeze, rales, rhonchi  Abdomen: +BS, soft, ND, NT. no palpable flank tenderness or mass, no CVA tenderness  Ext: No cyanosis, edema or calf tenderness  Neuro: AAOx1 to person, no other focal deficits.    Labs:                        13.6   10.2  )-----------( 141      ( 28 Dec 2018 06:04 )             40.5   12-28    138  |  103  |  23.0<H>  ----------------------------<  106  3.8   |  26.0  |  0.98    Ca    8.3<L>      28 Dec 2018 06:04  Phos  2.5     12-28  Mg     2.3     12-28 12-27 @ 07:01  -  12-28 @ 07:00  --------------------------------------------------------  IN: 480 mL / OUT: 1850 mL / NET: -1370 mL    12-28 @ 07:01  -  12-28 @ 18:10  --------------------------------------------------------  IN: 660 mL / OUT: 830 mL / NET: -170 mL    Radiology:  < from: US Duplex Carotid Arteries Complete, Bilateral (12.28.18 @ 16:21) >  IMPRESSION:       1.  Right carotid system:  50-69% stenosis caused by a moderate to severe   calcified atherosclerotic plaque disease within carotid bulb and distal   common carotid artery .  If carotid bruit present or further anatomical assessment required CT   angiography recommended.          2.  Left carotid system:  No hemodynamically significant stenosis is   found.          < from: CT Abdomen and Pelvis w/ IV Cont (12.28.18 @ 16:19) >      IMPRESSION:     Right upper lobe 2 cm sized spiculated lung mass consistent with a   primary pulmonary malignancy. A mildly enlarged right hilar node.    Enlarged prostate.    Additional findings as above.    Medications:  MEDICATIONS  (STANDING):  aspirin enteric coated 81 milliGRAM(s) Oral daily  atorvastatin 80 milliGRAM(s) Oral at bedtime    MEDICATIONS  (PRN):  acetaminophen   Tablet .. 650 milliGRAM(s) Oral every 6 hours PRN Mild Pain (1 - 3), Moderate Pain (4 - 6)  fentaNYL    Injectable 25 MICROGram(s) IV Push every 3 hours PRN Severe Pain (7 - 10)  hydrALAZINE Injectable 10 milliGRAM(s) IV Push every 6 hours PRN sbp > 180 or dbp > 100

## 2018-12-28 NOTE — CONSULT NOTE ADULT - SUBJECTIVE AND OBJECTIVE BOX
89yM was admitted on 12-26 with word finding difficulties. HPF=139, s/p Cardene drip. Patient received tPA.    HEAD CT - No acute intracranial hemorrhage. Acute/subacute left temporal lobe infarct. Additional age-indeterminate and chronic findings as above. Repeat imaging was stable x2.    CTA showed There is complete lack of flow in the intracranial portion of right vertebral artery. Vascular consultation recommends carotid doppler. May now need CEA.    Patient is aphasic with higher order questioning. Is frustrated and irritable as he is unable to recall why he is here.   States he is at home and wants to go back to his own home.      REVIEW OF SYSTEMS  Constitutional - No fever, No weight loss, No fatigue  HEENT - No eye pain, No visual disturbances, No difficulty hearing, No tinnitus, No vertigo, No neck pain  Respiratory - No cough, No wheezing, No shortness of breath  Cardiovascular - No chest pain, No palpitations  Gastrointestinal - No abdominal pain, No nausea, No vomiting, No diarrhea, No constipation  Genitourinary - No dysuria, No frequency, No hematuria, No incontinence  Neurological - No headaches, +memory loss, No loss of strength, No numbness, No tremors  Skin - No itching, No rashes, No lesions   Endocrine - No temperature intolerance  Musculoskeletal - No joint pain, No joint swelling, No muscle pain  Psychiatric - No depression, +anxiety    VITALS  T(C): 36.5 (12-28-18 @ 05:29), Max: 36.9 (12-27-18 @ 22:21)  HR: 77 (12-28-18 @ 05:29) (77 - 97)  BP: 162/70 (12-28-18 @ 05:29) (154/78 - 178/90)  RR: 18 (12-28-18 @ 05:29) (16 - 18)  SpO2: 94% (12-28-18 @ 05:29) (91% - 97%)  Wt(kg): --    PAST MEDICAL & SURGICAL HISTORY  No pertinent past medical history  H/O heart artery stent      SOCIAL HISTORY  Smoking - Denied  EtOH - Denied   Drugs - Denied    FUNCTIONAL HISTORY  Lives with family/girlfriend, 8 ERI  Independent    CURRENT FUNCTIONAL STATUS  12/27  Bed Mobility: Rolling/Turning:     · Level of [place]	stand-by assist	    Bed Mobility: Sit to Supine:     · Level of [place]	stand-by assist	    Bed Mobility: Supine to Sit:     · Level of [place]	stand-by assist	    Transfer: Sit to Stand:     · Level of [place]	minimum assist (75% patients effort)	    Transfer: Stand to Sit:     · Level of [place]	minimum assist (75% patients effort)	    Sit/Stand Transfer Safety Analysis:     · Transfer Safety Concerns Noted	decreased weight-shifting ability	  · Impairments Contributing to Impaired Transfers	impaired balance; decreased strength	    Gait Skills:     · Level of [place]	minimum assist (75% patients effort)	  · Physical Assist/Nonphysical Assist	1 person assist	  · Assistive Device	rolling walker	  · Gait Distance	25 feet	        FAMILY HISTORY   No pertinent family history in first degree relatives      RECENT LABS/IMAGING  CBC Full  -  ( 28 Dec 2018 06:04 )  WBC Count : 10.2 K/uL  Hemoglobin : 13.6 g/dL  Hematocrit : 40.5 %  Platelet Count - Automated : 141 K/uL  Mean Cell Volume : 92.7 fl  Mean Cell Hemoglobin : 31.1 pg  Mean Cell Hemoglobin Concentration : 33.6 g/dL  Auto Neutrophil # : 8.1 K/uL  Auto Lymphocyte # : 1.3 K/uL  Auto Monocyte # : 0.7 K/uL  Auto Eosinophil # : 0.1 K/uL  Auto Basophil # : 0.0 K/uL  Auto Neutrophil % : 79.0 %  Auto Lymphocyte % : 12.7 %  Auto Monocyte % : 6.8 %  Auto Eosinophil % : 1.0 %  Auto Basophil % : 0.3 %    12-28    138  |  103  |  23.0<H>  ----------------------------<  106  3.8   |  26.0  |  0.98    Ca    8.3<L>      28 Dec 2018 06:04  Phos  2.5     12-28  Mg     2.3     12-28          ALLERGIES  no seeds or spicy foods (Unknown)  pt likes chicken, rice, pasta, vegetables (bland diet) (Unknown)  sulfa drugs (Short breath)      MEDICATIONS   acetaminophen   Tablet .. 650 milliGRAM(s) Oral every 6 hours PRN  amLODIPine   Tablet 2.5 milliGRAM(s) Oral once  aspirin enteric coated 81 milliGRAM(s) Oral daily  atorvastatin 80 milliGRAM(s) Oral at bedtime  fentaNYL    Injectable 25 MICROGram(s) IV Push every 3 hours PRN  hydrALAZINE Injectable 10 milliGRAM(s) IV Push every 6 hours PRN      ----------------------------------------------------------------------------------------  PHYSICAL EXAM  Constitutional - NAD, Comfortable  HEENT - NCAT, EOMI  Neck - Supple, No limited ROM  Chest - Breathing comfortably, No wheezing  Cardiovascular - S1S2   Abdomen - Soft   Extremities - No C/C/E, No calf tenderness   Neurologic Exam -                    Cognitive - Awake, Alert, AAO to self, not place, not situation, not month with choices      Communication - Fluent, Echolalia, Word finding difficulties, Anomia     Cranial Nerves - CN 2-12 intact     Motor - No focal deficits                    LEFT    UE - ShAB 5/5, EF 5/5, EE 5/5, WE 5/5,  5/5                    RIGHT UE - ShAB 5/5, EF 5/5, EE 5/5, WE 5/5,  5/5                    LEFT    LE - HF 5/5, KE 5/5, DF 5/5, PF 5/5                    RIGHT LE - HF 5/5, KE 5/5, DF 5/5, PF 5/5        Sensory - Intact to LT  Psychiatric - Mood frustrated, irritable, Affect WNL  ----------------------------------------------------------------------------------------  ASSESSMENT/PLAN  89yMale with functional deficits after an acute left temporal CVA	  Pain - Tylenol, Recommend DC 	Fentanyl  CVA - ASA, Lipitor  HTN - Norvasc, Apresoline  DVT PPX - SCDs  Rehab - Medically/surgically being optimized, may need CEA. Will continue to follow for ongoing rehab needs and recommendations. May be able to go home if primary deficits are with language.

## 2018-12-28 NOTE — PROGRESS NOTE ADULT - SUBJECTIVE AND OBJECTIVE BOX
INTERVAL HPI/OVERNIGHT EVENTS/SUBJECTIVE:  89y m with left temporal infarct. Pt still with expressive aphasia. Otherwise, pleasant offers no complaints.     ICU Vital Signs Last 24 Hrs  T(C): 36.6 (28 Dec 2018 10:15), Max: 36.9 (27 Dec 2018 22:21)  T(F): 97.9 (28 Dec 2018 10:15), Max: 98.5 (27 Dec 2018 22:21)  HR: 80 (28 Dec 2018 10:15) (77 - 85)  BP: 172/70 (28 Dec 2018 10:15) (154/78 - 178/90)  BP(mean): --  ABP: --  ABP(mean): --  RR: 19 (28 Dec 2018 10:15) (16 - 19)  SpO2: 95% (28 Dec 2018 10:15) (91% - 97%)      I&O's Detail    27 Dec 2018 07:01  -  28 Dec 2018 07:00  --------------------------------------------------------  IN:    Oral Fluid: 480 mL  Total IN: 480 mL    OUT:    Voided: 1850 mL  Total OUT: 1850 mL    Total NET: -1370 mL      28 Dec 2018 07:01  -  28 Dec 2018 11:03  --------------------------------------------------------  IN:    Oral Fluid: 300 mL  Total IN: 300 mL    OUT:    Voided: 330 mL  Total OUT: 330 mL    Total NET: -30 mL    MEDICATIONS  (STANDING):  aspirin enteric coated 81 milliGRAM(s) Oral daily  atorvastatin 80 milliGRAM(s) Oral at bedtime    MEDICATIONS  (PRN):  acetaminophen   Tablet .. 650 milliGRAM(s) Oral every 6 hours PRN Mild Pain (1 - 3), Moderate Pain (4 - 6)  fentaNYL    Injectable 25 MICROGram(s) IV Push every 3 hours PRN Severe Pain (7 - 10)  hydrALAZINE Injectable 10 milliGRAM(s) IV Push every 6 hours PRN sbp > 180 or dbp > 100    MISC:     PHYSICAL EXAM:  Constitutional: alert, no distress.  difficulty with verbal expressions  Eyes: no jaundice, occular rom intact   ENMT: atraumatic   Respiratory: non labored breathing   Cardiovascular:  s1/s2   Extremities: warm, moving all extremities on command   neurological; No gross motor or sensory deficits encountered   Psychiatric: good affect     LABS:  CBC Full  -  ( 28 Dec 2018 06:04 )  WBC Count : 10.2 K/uL  Hemoglobin : 13.6 g/dL  Hematocrit : 40.5 %  Platelet Count - Automated : 141 K/uL  Mean Cell Volume : 92.7 fl  Mean Cell Hemoglobin : 31.1 pg  Mean Cell Hemoglobin Concentration : 33.6 g/dL  Auto Neutrophil # : 8.1 K/uL  Auto Lymphocyte # : 1.3 K/uL  Auto Monocyte # : 0.7 K/uL  Auto Eosinophil # : 0.1 K/uL  Auto Basophil # : 0.0 K/uL  Auto Neutrophil % : 79.0 %  Auto Lymphocyte % : 12.7 %  Auto Monocyte % : 6.8 %  Auto Eosinophil % : 1.0 %  Auto Basophil % : 0.3 %    12-28    138  |  103  |  23.0<H>  ----------------------------<  106  3.8   |  26.0  |  0.98    Ca    8.3<L>      28 Dec 2018 06:04  Phos  2.5     12-28  Mg     2.3     12-28    ASSESSMENT/PLAN:  89yMale with left temporal lobe infarct.   -f/u carotid duplex  -continue aspirin  -would recommend dual anti-platelet therapy with plavix   -will determine OR plan  -d/w Dr. Orellana

## 2018-12-29 LAB
ANION GAP SERPL CALC-SCNC: 12 MMOL/L — SIGNIFICANT CHANGE UP (ref 5–17)
BUN SERPL-MCNC: 24 MG/DL — HIGH (ref 8–20)
CALCIUM SERPL-MCNC: 8.8 MG/DL — SIGNIFICANT CHANGE UP (ref 8.6–10.2)
CHLORIDE SERPL-SCNC: 101 MMOL/L — SIGNIFICANT CHANGE UP (ref 98–107)
CO2 SERPL-SCNC: 22 MMOL/L — SIGNIFICANT CHANGE UP (ref 22–29)
CREAT SERPL-MCNC: 0.97 MG/DL — SIGNIFICANT CHANGE UP (ref 0.5–1.3)
GLUCOSE SERPL-MCNC: 126 MG/DL — HIGH (ref 70–115)
POTASSIUM SERPL-MCNC: 3.8 MMOL/L — SIGNIFICANT CHANGE UP (ref 3.5–5.3)
POTASSIUM SERPL-SCNC: 3.8 MMOL/L — SIGNIFICANT CHANGE UP (ref 3.5–5.3)
SODIUM SERPL-SCNC: 135 MMOL/L — SIGNIFICANT CHANGE UP (ref 135–145)

## 2018-12-29 PROCEDURE — 99233 SBSQ HOSP IP/OBS HIGH 50: CPT

## 2018-12-29 PROCEDURE — 99497 ADVNCD CARE PLAN 30 MIN: CPT

## 2018-12-29 PROCEDURE — 99233 SBSQ HOSP IP/OBS HIGH 50: CPT | Mod: GC

## 2018-12-29 RX ORDER — CLOPIDOGREL BISULFATE 75 MG/1
75 TABLET, FILM COATED ORAL DAILY
Qty: 0 | Refills: 0 | Status: DISCONTINUED | OUTPATIENT
Start: 2018-12-29 | End: 2018-12-31

## 2018-12-29 RX ADMIN — ATORVASTATIN CALCIUM 80 MILLIGRAM(S): 80 TABLET, FILM COATED ORAL at 21:22

## 2018-12-29 RX ADMIN — Medication 81 MILLIGRAM(S): at 11:28

## 2018-12-29 RX ADMIN — Medication 10 MILLIGRAM(S): at 03:21

## 2018-12-29 RX ADMIN — AMLODIPINE BESYLATE 5 MILLIGRAM(S): 2.5 TABLET ORAL at 06:01

## 2018-12-29 NOTE — PROGRESS NOTE ADULT - ASSESSMENT
89y Male who is followed by neurology because of acute CVA, s/p tPA    Stroke  Acute left MCA CVA  s/p tPA  Follow up head CT 24 hours after tPA no blood. ASA 81 mg daily started.  Continue high dose statin  Rehab eval appreciated.     Carotid stenosis.  Vascular following.  Symptomatic side by doppler is not as severe as on CT-A.      Possible Lung mass.  Further work up per medicine.     Case discussed with Dr Ariza.

## 2018-12-29 NOTE — PROGRESS NOTE ADULT - SUBJECTIVE AND OBJECTIVE BOX
CC: Patient is a 89y old  Male who presents with a chief complaint of word finding difficulties/not recognizing family (29 Dec 2018 08:03)    Patient seen and examined at bedside, No acute overnight events.  Patient ambulating with rolling walker, eating well, voiding.  Cardiac monitor reviewed; intermittent PVCs noted. No other alarms.    Patient this morning says that he feels fine. Is not sure where he is or why he is here. Currently denying any complaints at this time.     ROS: Unable to fully determine given patient's current clinical state    VS:   Vital Signs Last 24 Hrs  T(C): 36.6 (29 Dec 2018 10:00), Max: 36.9 (28 Dec 2018 17:11)  T(F): 97.8 (29 Dec 2018 10:00), Max: 98.4 (28 Dec 2018 17:11)  HR: 78 (29 Dec 2018 10:00) (78 - 89)  BP: 142/80 (29 Dec 2018 10:00) (139/74 - 192/84)  BP(mean): --  RR: 16 (29 Dec 2018 10:00) (16 - 18)  SpO2: 98% (29 Dec 2018 10:00) (96% - 98%)    Physical Exam:   Gen: elderly male, sleeping in bed, NAD  HEENT: NCAT, EOMI, PERRLA, Pupils 5mm to 4mm. moist oral mucosa  CVS: RRR, +S1/S2, no murmurs, rubs or gallops appreciated  Pulm: CTAB, no wheeze, rales, rhonchi  GI: +BS, soft, ND, NT.   Ext: No cyanosis, edema or calf tenderness. 2+ peripheral pulses  Neuro: AAOx1 to self, Not oriented to location, time or circumstances. No focal deficits. Normal muscle tone, freely moves all 4 extremities. Sensation intact b/l. CN 2-12 grossly intact.     Labs:                        13.6   10.2  )-----------( 141      ( 28 Dec 2018 06:04 )             40.5   12-29    135  |  101  |  24.0<H>  ----------------------------<  126<H>  3.8   |  22.0  |  0.97    Ca    8.8      29 Dec 2018 05:57  Phos  2.5     12-28  Mg     2.3     12-28 12-28 @ 07:01 - 12-29 @ 07:00  --------------------------------------------------------  IN: 900 mL / OUT: 830 mL / NET: 70 mL    12-29 @ 07:01 - 12-29 @ 15:49  --------------------------------------------------------  IN: 480 mL / OUT: 0 mL / NET: 480 mL        Radiology:  No new studies     Medications:  MEDICATIONS  (STANDING):  amLODIPine   Tablet 5 milliGRAM(s) Oral daily  aspirin enteric coated 81 milliGRAM(s) Oral daily  atorvastatin 80 milliGRAM(s) Oral at bedtime    MEDICATIONS  (PRN):  acetaminophen   Tablet .. 650 milliGRAM(s) Oral every 6 hours PRN Mild Pain (1 - 3), Moderate Pain (4 - 6)  fentaNYL    Injectable 25 MICROGram(s) IV Push every 3 hours PRN Severe Pain (7 - 10)  hydrALAZINE Injectable 10 milliGRAM(s) IV Push every 6 hours PRN sbp > 180 or dbp > 100

## 2018-12-29 NOTE — PROGRESS NOTE ADULT - SUBJECTIVE AND OBJECTIVE BOX
INTERVAL HPI/OVERNIGHT EVENTS:    No acute events reported overnight. CT chest done yesterday revealed a RUL 2 cm sized spiculated lung mass consistent with a primary   pulmonary malignancy as well as a mildly enlarged right hilar node. Further workup warranted also for pancreatic mass. Carotid Duplex done yesterday showed R carotid 50-69% stenosis, moderate-severe calcified atherosclerotic plaque while the L carotid showed no significant stenosis. Patient on ASA and Plavix.       MEDICATIONS  (STANDING):  amLODIPine   Tablet 5 milliGRAM(s) Oral daily  aspirin enteric coated 81 milliGRAM(s) Oral daily  atorvastatin 80 milliGRAM(s) Oral at bedtime    MEDICATIONS  (PRN):  acetaminophen   Tablet .. 650 milliGRAM(s) Oral every 6 hours PRN Mild Pain (1 - 3), Moderate Pain (4 - 6)  fentaNYL    Injectable 25 MICROGram(s) IV Push every 3 hours PRN Severe Pain (7 - 10)  hydrALAZINE Injectable 10 milliGRAM(s) IV Push every 6 hours PRN sbp > 180 or dbp > 100      Vital Signs Last 24 Hrs  T(C): 36.8 (28 Dec 2018 20:56), Max: 36.9 (28 Dec 2018 17:11)  T(F): 98.2 (28 Dec 2018 20:56), Max: 98.4 (28 Dec 2018 17:11)  HR: 81 (28 Dec 2018 20:56) (77 - 85)  BP: 192/84 (28 Dec 2018 20:56) (140/80 - 192/84)  BP(mean): --  RR: 17 (28 Dec 2018 20:56) (17 - 19)  SpO2: 97% (28 Dec 2018 20:56) (94% - 97%)    PHYSICAL EXAM:  Constitutional: alert, no distress.  difficulty with verbal expressions  Eyes: no jaundice, occular rom intact   ENMT: atraumatic   Respiratory: non labored breathing   Cardiovascular:  s1/s2   Extremities: warm, moving all extremities on command   neurological; No gross motor or sensory deficits encountered   Psychiatric: good affect      I&O's Detail    27 Dec 2018 07:01  -  28 Dec 2018 07:00  --------------------------------------------------------  IN:    Oral Fluid: 480 mL  Total IN: 480 mL    OUT:    Voided: 1850 mL  Total OUT: 1850 mL    Total NET: -1370 mL      28 Dec 2018 07:01  -  29 Dec 2018 01:32  --------------------------------------------------------  IN:    Oral Fluid: 780 mL  Total IN: 780 mL    OUT:    Voided: 830 mL  Total OUT: 830 mL    Total NET: -50 mL          LABS:                        13.6   10.2  )-----------( 141      ( 28 Dec 2018 06:04 )             40.5     12-28    138  |  103  |  23.0<H>  ----------------------------<  106  3.8   |  26.0  |  0.98    Ca    8.3<L>      28 Dec 2018 06:04  Phos  2.5     12-28  Mg     2.3     12-28            RADIOLOGY & ADDITIONAL STUDIES:

## 2018-12-29 NOTE — PROGRESS NOTE ADULT - SUBJECTIVE AND OBJECTIVE BOX
[facility]                                        Neurology at [place]                                 [facility] Reed, & Cruz                                  [address]                                        [place], [place], [address]                                             [phone]        CC: Stroke    HPI:   The patient is a 89y Male who presented to [facility] with acute onset of aphasia and confusion.  He was evaluated by tele-stroke, and although an early small left temporal lobe CVA was seen, t-PA was administered because his deficits were larger than what should have been caused by the stroke on CT.      Interim history:  Remains aphasic.     ROS: (Somewhat unreliable due to aphasia).  Denies headache or dizziness.  Denies chest pain.  Denies shortness of breath.    MEDICATIONS  (STANDING):  amLODIPine   Tablet 5 milliGRAM(s) Oral daily  aspirin enteric coated 81 milliGRAM(s) Oral daily  atorvastatin 80 milliGRAM(s) Oral at bedtime      Vital Signs Last 24 Hrs  T(C): 36.7 (29 Dec 2018 05:04), Max: 36.9 (28 Dec 2018 17:11)  T(F): 98 (29 Dec 2018 05:04), Max: 98.4 (28 Dec 2018 17:11)  HR: 89 (29 Dec 2018 05:04) (80 - 89)  BP: 139/74 (29 Dec 2018 05:04) (139/74 - 192/84)  RR: 18 (29 Dec 2018 05:04) (17 - 19)  SpO2: 97% (29 Dec 2018 05:04) (95% - 97%)    Detailed Neurologic Exam:    Mental status: The patient is awake and alert and has expressive>receptive aphasia.    Cranial nerves: Pupils equal and react symmetrically to light. There is no visual field deficit to threat. Extraocular motion is full with no nystagmus. There is no ptosis. Facial sensation is intact. Facial musculature is symmetric. Palate elevates symmetrically. Shoulder shrug is normal. Tongue is midline.    Motor: There is normal bulk and tone.  There is no tremor.  Unable to formally test power due to aphasia, but moves 4 ext well with grossly 5/5 power.    Sensation: Grimace to pin in 4 extremities    Reflexes: 1+ throughout and plantar responses are flexor.    Cerebellar: Difficult to assess although grossly there is no dysmetria on finger to nose testing.    Labs:     12-29    135  |  101  |  24.0<H>  ----------------------------<  126<H>  3.8   |  22.0  |  0.97    Ca    8.8      29 Dec 2018 05:57  Phos  2.5     12-28  Mg     2.3     12-28                          13.6   10.2  )-----------( 141      ( 28 Dec 2018 06:04 )             40.5       Rad:   Carotid duplex shows   50-69% stenosis on the right (asymptomatic) side and no significant stenosis on the left (symptomatic) side.

## 2018-12-29 NOTE — PROGRESS NOTE ADULT - ASSESSMENT
89 y/o male with pmhx of remote (>10yrs) history of cardiac stents presents with slurred speech and forgetting family members names found to have left temporal infarct on CT head. Found to have  placed on Cardene infusion then s/p tPA at 1:09 am on 12/26. Ct angio head shows complete block of blood flow to right vertebral artery. Cardene infusion discontinued prior to CT-ICU transfer. Repeat CT after 24 hours shows no hemorrhage or worsening of infarction.    Acute left MCA CVA   -c/w ASA 81 mg po qdaily,  plavix 75 daily  -c/w Atorvastatin 80 mg po qdaily  -pain control: Tylenol prn for mild, Fentanyl 25mcg for severe  -aspiration, fall precautions    Hypertension  - Uncontrolled at this time  - no hx of htn prior to admission, currently within acceptable range  - Norvasc 5 mg PO daily  - Hydralazine 10 mg iv q2h PRN for SBP > 180    Right carotid artery stenosis  - 50-69% stenosis caused by a moderate to severe calcified atherosclerotic plaque disease within carotid bulb and distal common carotid artery  Vascular consult appreciated  - patient being planned for possible OR for carotid endarterectomy (CEA) but given inconsistent findings between US carotids and MR Angio neck, will need to reassess with vascular for plan to be undertaken.     Lung Mass  - Consent to CT chest with contrast obtained over the phone with daughter  - CT chest showing Right upper lobe 2 cm sized spiculated lung mass consistent with a primary pulmonary malignancy   - Also notable for 4 mm sized low density in the head of the pancreas, it may represent an IPMN  - Team will discuss results if CT chest with family and consider further work-up at that time given current state and expected goals of care.     CAD  - continue statin + ASA    Preventive measure  -dvt ppx: s/p tPA on 12/26 at 1:00am, on DAPT with SCD boots at this time.

## 2018-12-29 NOTE — PROGRESS NOTE ADULT - ASSESSMENT
A/P: 89 year old male with CVA and duplex confirming moderate to severe stenosis of R carotid artery now being worked up for cancerous lesion of the Right lower lobe of the lung as well as the head of the pancreas. Goals of care discussions with the patient and family are underway and will direct further management.      -continue aspirin and plavix  -goals of care discussions with patient and family   -Vascular Surgery to continue following  -rest of care per primary team

## 2018-12-30 PROCEDURE — 99497 ADVNCD CARE PLAN 30 MIN: CPT

## 2018-12-30 PROCEDURE — 99232 SBSQ HOSP IP/OBS MODERATE 35: CPT | Mod: GC

## 2018-12-30 RX ORDER — AMLODIPINE BESYLATE 2.5 MG/1
1 TABLET ORAL
Qty: 30 | Refills: 0 | OUTPATIENT
Start: 2018-12-30 | End: 2019-01-28

## 2018-12-30 RX ORDER — ASPIRIN/CALCIUM CARB/MAGNESIUM 324 MG
1 TABLET ORAL
Qty: 30 | Refills: 0 | OUTPATIENT
Start: 2018-12-30 | End: 2019-01-28

## 2018-12-30 RX ORDER — CLOPIDOGREL BISULFATE 75 MG/1
1 TABLET, FILM COATED ORAL
Qty: 30 | Refills: 0 | OUTPATIENT
Start: 2018-12-30 | End: 2019-01-28

## 2018-12-30 RX ORDER — ACETAMINOPHEN 500 MG
2 TABLET ORAL
Qty: 0 | Refills: 0 | COMMUNITY
Start: 2018-12-30

## 2018-12-30 RX ADMIN — Medication 81 MILLIGRAM(S): at 09:14

## 2018-12-30 RX ADMIN — AMLODIPINE BESYLATE 5 MILLIGRAM(S): 2.5 TABLET ORAL at 05:12

## 2018-12-30 RX ADMIN — CLOPIDOGREL BISULFATE 75 MILLIGRAM(S): 75 TABLET, FILM COATED ORAL at 09:14

## 2018-12-30 RX ADMIN — ATORVASTATIN CALCIUM 80 MILLIGRAM(S): 80 TABLET, FILM COATED ORAL at 21:07

## 2018-12-30 NOTE — PROGRESS NOTE ADULT - ASSESSMENT
A/P: 89 year old male with CVA and duplex confirming moderate to severe stenosis of R carotid artery now being worked up for cancerous lesion of the Right lower lobe of the lung as well as the head of the pancreas. Goals of care discussions with the patient and family are underway and will direct further management.    -plan for CEA  -continue aspirin and plavix  -goals of care discussions with patient and family   -Vascular Surgery to continue following  -rest of care per primary team A/P: 89 year old male with CVA and duplex confirming moderate to severe stenosis of R carotid artery now being worked up for cancerous lesion of the Right lower lobe of the lung as well as the head of the pancreas. Goals of care discussions with the patient and family are underway and will direct further management.    -Discussed with Dr. Melgoza today - plan is to not have patient undergo CEA - discussed with patient and with Dr. Ariza and her team on rounds today  -continue aspirin and Plavix  -Vascular Surgery cleared patient for discharge - primary team to discuss with him today  -rest of care per primary team

## 2018-12-30 NOTE — PROGRESS NOTE ADULT - SUBJECTIVE AND OBJECTIVE BOX
No acute events over night. VSS. No complaints at this time.    Recent CT chest revealed a RUL 2 cm sized spiculated lung mass consistent with a primary pulmonary malignancy as well as a mildly enlarged right hilar node.   Carotid duplex demonstrated carotid 50-69% stenosis, moderate-severe calcified atherosclerotic plaque while the L carotid showed no significant stenosis.     MEDICATIONS  (STANDING):  amLODIPine   Tablet 5 milliGRAM(s) Oral daily  aspirin enteric coated 81 milliGRAM(s) Oral daily  atorvastatin 80 milliGRAM(s) Oral at bedtime  clopidogrel Tablet 75 milliGRAM(s) Oral daily    MEDICATIONS  (PRN):  acetaminophen   Tablet .. 650 milliGRAM(s) Oral every 6 hours PRN Mild Pain (1 - 3), Moderate Pain (4 - 6)  fentaNYL    Injectable 25 MICROGram(s) IV Push every 3 hours PRN Severe Pain (7 - 10)  hydrALAZINE Injectable 10 milliGRAM(s) IV Push every 6 hours PRN sbp > 180 or dbp > 100      Vital Signs Last 24 Hrs  T(C): 36.9 (30 Dec 2018 05:11), Max: 36.9 (30 Dec 2018 05:11)  T(F): 98.5 (30 Dec 2018 05:11), Max: 98.5 (30 Dec 2018 05:11)  HR: 77 (30 Dec 2018 05:11) (74 - 84)  BP: 148/82 (30 Dec 2018 05:11) (140/69 - 148/82)  BP(mean): --  RR: 16 (30 Dec 2018 05:11) (14 - 18)  SpO2: 99% (30 Dec 2018 05:11) (94% - 99%)    PE  Constitutional: alert, no distress.  difficulty with verbal expressions  Eyes: no jaundice, occular rom intact   ENMT: atraumatic   Respiratory: non labored breathing   Cardiovascular:  s1/s2   Extremities: warm, moving all extremities on command   neurological; No gross motor or sensory deficits encountered   Psychiatric: good affect  I&O's Detail    29 Dec 2018 07:01  -  30 Dec 2018 07:00  --------------------------------------------------------  IN:    Oral Fluid: 480 mL  Total IN: 480 mL    OUT:  Total OUT: 0 mL    Total NET: 480 mL          LABS:    12-29    135  |  101  |  24.0<H>  ----------------------------<  126<H>  3.8   |  22.0  |  0.97    Ca    8.8      29 Dec 2018 05:57            RADIOLOGY & ADDITIONAL STUDIES: No acute events over night. VSS. No complaints at this time.    Recent CT chest revealed a RUL 2 cm sized spiculated lung mass consistent with a primary pulmonary malignancy as well as a mildly enlarged right hilar node.   Carotid duplex demonstrated carotid 50-69% stenosis, moderate-severe calcified atherosclerotic plaque while the L carotid showed no significant stenosis.     MEDICATIONS  (STANDING):  amLODIPine   Tablet 5 milliGRAM(s) Oral daily  aspirin enteric coated 81 milliGRAM(s) Oral daily  atorvastatin 80 milliGRAM(s) Oral at bedtime  clopidogrel Tablet 75 milliGRAM(s) Oral daily     MEDICATIONS  (PRN):  acetaminophen   Tablet .. 650 milliGRAM(s) Oral every 6 hours PRN Mild Pain (1 - 3), Moderate Pain (4 - 6)  fentaNYL    Injectable 25 MICROGram(s) IV Push every 3 hours PRN Severe Pain (7 - 10)  hydrALAZINE Injectable 10 milliGRAM(s) IV Push every 6 hours PRN sbp > 180 or dbp > 100      Vital Signs Last 24 Hrs  T(C): 36.9 (30 Dec 2018 05:11), Max: 36.9 (30 Dec 2018 05:11)  T(F): 98.5 (30 Dec 2018 05:11), Max: 98.5 (30 Dec 2018 05:11)  HR: 77 (30 Dec 2018 05:11) (74 - 84)  BP: 148/82 (30 Dec 2018 05:11) (140/69 - 148/82)  BP(mean): --  RR: 16 (30 Dec 2018 05:11) (14 - 18)  SpO2: 99% (30 Dec 2018 05:11) (94% - 99%)    PE  Constitutional: alert, no distress.  difficulty with verbal expressions  Eyes: no jaundice, occular rom intact   ENMT: atraumatic   Respiratory: non labored breathing   Cardiovascular:  s1/s2   Extremities: warm, moving all extremities on command   neurological; No gross motor or sensory deficits encountered   Psychiatric: good affect  I&O's Detail    29 Dec 2018 07:01  -  30 Dec 2018 07:00  --------------------------------------------------------  IN:    Oral Fluid: 480 mL  Total IN: 480 mL    OUT:  Total OUT: 0 mL    Total NET: 480 mL          LABS:    12-29    135  |  101  |  24.0<H>  ----------------------------<  126<H>  3.8   |  22.0  |  0.97    Ca    8.8      29 Dec 2018 05:57            RADIOLOGY & ADDITIONAL STUDIES:

## 2018-12-30 NOTE — PROGRESS NOTE ADULT - ASSESSMENT
89 y/o male with pmhx of remote (>10yrs) history of cardiac stents presents with slurred speech and forgetting family members names found to have left temporal infarct on CT head. Found to have  placed on Cardene infusion then s/p tPA at 1:09 am on 12/26. Ct angio head shows complete block of blood flow to right vertebral artery. Cardene infusion discontinued prior to CT-ICU transfer. Repeat CT after 24 hours shows no hemorrhage or worsening of infarction. Pt transferred to medical/resident team after BP has stabilized. Only moderate expressive and receptive aphasia deficits are notable. He is treated with ASA, plavix and a statin. He is noted to have R carotid stenosis on CT, vascular and family decide to not entertain CEA at this time. A RLL 2cm nodule is found on CXR. It is further characterized by CT. However, on discussion with the family this is a known lesion. They will continue to follow up out patient. Pt is ready for discharge home 12/30, but the family is unable to provide supervision, and request LEONARD.     Acute left MCA CVA   -c/w ASA 81 mg po qdaily,  plavix 75 daily  -c/w Atorvastatin 80 mg po qdaily  -pain control: Tylenol prn for mild, Fentanyl 25mcg for severe  -aspiration, fall precautions    Hypertension  - Uncontrolled at this time  - no hx of htn prior to admission, currently within acceptable range  - Norvasc 5 mg PO daily  - Hydralazine 10 mg iv q2h PRN for SBP > 180    Right carotid artery stenosis  - 50-69% stenosis caused by a moderate to severe calcified atherosclerotic plaque disease within carotid bulb and distal common carotid artery  Vascular consult appreciated  - Family and Vascular agree to possibly indefinitely postpone CEA. Will follow up outpatient    Lung Mass  - Consent to CT chest with contrast obtained over the phone with daughter  - CT chest showing Right upper lobe 2 cm sized spiculated lung mass consistent with a primary pulmonary malignancy   - Also notable for 4 mm sized low density in the head of the pancreas, it may represent an IPMN  - Family is aware of lung nodule, but pancreatic mass is new as far as they know.   - Pt and family will follow up outpatient    CAD  - continue statin + ASA    Preventive measure  -dvt ppx: s/p tPA on 12/26 at 1:00am, on DAPT with SCD boots at this time.    Disposition  - Pt is medically ready for discharge. Pt is living with his daughter, and his wife is recovering from major surgery. The daughter works 5 days a week, and it is unsafe for him to be unsupervised at this time. He is awaiting placement at [facility]. 89 y/o male with pmhx of remote (>10yrs) history of cardiac stents presents with slurred speech and forgetting family members names found to have left temporal infarct on CT head. Found to have  placed on Cardene infusion then s/p tPA at 1:09 am on 12/26. Ct angio head shows complete block of blood flow to right vertebral artery. Cardene infusion discontinued prior to CT-ICU transfer. Repeat CT after 24 hours shows no hemorrhage or worsening of infarction. Pt transferred to medical/resident team after BP has stabilized. Only moderate expressive and receptive aphasia deficits are notable. He is treated with ASA, plavix and a statin. He is noted to have R carotid stenosis on CT, vascular and family decide to not entertain CEA at this time. A RLL 2cm nodule is found on CXR. It is further characterized by CT. However, on discussion with the family this is a known lesion. They will continue to follow up out patient. Pt is ready for discharge home 12/30, but the family is unable to provide supervision, and request LEONARD.     Acute left MCA CVA   -stable, complicated by both receptive + expressive aphasia  -c/w ASA 81 mg po qdaily,  plavix 75 daily  -c/w Atorvastatin 80 mg po qdaily  -pain control prn  -aspiration, fall precautions  -pt/ot/pmr eval appreciated, pt with mild deficits but good functional potential  -Goals of Care discussed at length with the patient's family. This conversation included current condition, prognosis, and options for therapy. Discussed desires by patient for further care and wishes were expressed - rehab measures desired. Advanced directives discussed. Family present at the bedside, son + daughter. while they do understand that pt does not have many physical deficits that should preclude him from going home, because of his cognitive deficits with aphasia and occ difficulty following commands, they are concerned about his safety if he were to stay at home alone. they state that he does well with prompting regarding activity but that he can easily be a harm to himself if left in the home alone as he does benefit from having assist for certain things, he was to move in with the daughter but she works 9-5 M-F and cannot provide aides during that time to care for him, they therefore are requesting LEONARD at the moment until they can settle affairs and ensure his home environment will be safe. also discussed again at length findings on CT chest-abd-pelvis, reportedly pt may have had this lung mass present in the past, they will discuss with his pmd, but they state that pancreatic lesion was unknown to them previously. explained that these lesions may or may not be malignant and that follow up is required, darren if they desire intervention, they are aware, for now are not leaning towards aggressive interventions but rather surveillance given his advanced age and unlikelihood for them to pursue aggressive tx such as chemo/radiation even if indicated. Length of Conversation ~45min.    Hypertension  - Uncontrolled this am, sbp into 190s but noted to improve in afternoon, asymptomatic  - will have nursing do manual bp prior to adjusting meds based on electronic findings as it was previously better controlled on current regimen  - no hx of htn prior to admission, new onset dx  - Norvasc 5 mg PO daily started inpatient  - Hydralazine 10 mg PRN for SBP > 180    BL carotid artery stenosis  - rt sided 50-69% stenosis caused by a moderate to severe calcified atherosclerotic plaque disease within carotid bulb and distal common carotid artery, also was noted to have Lt sided plaque on MRA as well, disputed by carotid us which stated no left sided plaque  -Vascular consult appreciated, per them there is plaque on Lt sided but unlikely to embolize and appears stable  - Family and Vascular agree to defer CEA, at this time no longer indicated, benefits do not outweigh procedure risks darren in pt of advanced age who otherwise is medically stable + potentially with underlying malignancy      RUL Lung Mass  - incidentally noted  - CT chest showing Right upper lobe 2 cm sized spiculated lung mass consistent with a primary pulmonary malignancy   - per family, pt may have had this lesion present in past  - discussed @ length that w/o bx no definitive dx can be made, but family aware that one potential dx if for malignancy  - plan as above according to GOC conversation      Pancreatic Lesion  -incidentally noted  - 4 mm sized low density in the head of the pancreas, 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   - Pt and family will follow up outpatient, they are aware of malignant potential, plan as above regarding GOC and management plans    CAD  - chronic, stable, uncomplicated  - continue statin + ASA    Preventive measure  -dvt ppx: SCD boots at this time.    Disposition  - Pt is medically ready for discharge. Pt is living with his daughter, and his wife is recovering from major surgery. The daughter works 5 days a week, and it is unsafe for him to be unsupervised at this time. He is awaiting placement at [facility].

## 2018-12-30 NOTE — PROGRESS NOTE ADULT - SUBJECTIVE AND OBJECTIVE BOX
Patient is a 89y old  Male who presents with a chief complaint of word finding difficulties/not recognizing family (30 Dec 2018 09:21)    INTERVAL HPI/OVERNIGHT EVENTS:  89yo Male seen at bedside and chart reviewed. Pt has no complaints overnight. Pt has receptive and expressive aphasia. He is pleasant, but it is unclear whether his yes and no responses can be trusted. Therefore, he was not able to participate with ROS.    Allergies    sulfa drugs (Short breath)    Intolerances    no seeds or spicy foods (Unknown)  pt likes chicken, rice, pasta, vegetables (bland diet) (Unknown)    Vital Signs Last 24 Hrs  T(C): 36.7 (30 Dec 2018 10:58), Max: 36.9 (30 Dec 2018 05:11)  T(F): 98 (30 Dec 2018 10:58), Max: 98.5 (30 Dec 2018 05:11)  HR: 82 (30 Dec 2018 10:58) (74 - 84)  BP: 168/88 (30 Dec 2018 11:33) (140/69 - 194/85)  RR: 18 (30 Dec 2018 10:58) (14 - 18)  SpO2: 97% (30 Dec 2018 10:58) (94% - 99%)    Daily     Daily Weight in k.8 (30 Dec 2018 00:48)  I&O's Detail    29 Dec 2018 07:01  -  30 Dec 2018 07:00  --------------------------------------------------------  IN:    Oral Fluid: 480 mL  Total IN: 480 mL    OUT:  Total OUT: 0 mL    Total NET: 480 mL      30 Dec 2018 07:  -  30 Dec 2018 16:02  --------------------------------------------------------  IN:    Oral Fluid: 840 mL  Total IN: 840 mL    OUT:    Voided: 400 mL  Total OUT: 400 mL    Total NET: 440 mL      PHYSICAL EXAM:  GENERAL: Pleasant man appears his age, NAD, well-groomed, well-developed  HEAD:  Atraumatic, Normocephalic  EYES: EOMI, PERRLA, conjunctiva and sclera clear  ENMT: Moist mucous membranes, Good dentition, No lesions  NECK: Supple, No JVD  NERVOUS SYSTEM:  Alert & Oriented X 1 to person only (and only name) Pt exhibits expressive ephasia responding to request for date with words that have nothing to do with the calendar, Poor concentration; Motor Strength 5/5 B/L upper and lower extremities  CHEST/LUNG: Rales B/L in bases. No rhonchi, wheezing, or rubs  HEART: Regular rate; No murmurs, rubs, or gallops  ABDOMEN: Soft, Nontender, Nondistended; Bowel sounds present  EXTREMITIES:  2+ Peripheral Pulses, No clubbing, cyanosis, or edema  PSYCH: Normal mood and affect  SKIN: No rashes or lesions    LABS:    135  |  101  |  24.0<H>  ----------------------------<  126<H>  3.8   |  22.0  |  0.97    Ca    8.8      29 Dec 2018 05:57    Hemoglobin A1C, Whole Blood: 5.4 % ( @ 06:04)      CARDIOVASCULAR:  amLODIPine   Tablet 5 milliGRAM(s) Oral daily  hydrALAZINE Injectable 10 milliGRAM(s) IV Push every 6 hours PRN    NEUROLOGIC:  acetaminophen   Tablet .. 650 milliGRAM(s) Oral every 6 hours PRN  fentaNYL    Injectable 25 MICROGram(s) IV Push every 3 hours PRN    HEMATOLOGIC:  aspirin enteric coated 81 milliGRAM(s) Oral daily  clopidogrel Tablet 75 milliGRAM(s) Oral daily    ENDO/METABOLIC:  atorvastatin 80 milliGRAM(s) Oral at bedtime      RADIOLOGY & ADDITIONAL TESTS:

## 2018-12-31 VITALS
DIASTOLIC BLOOD PRESSURE: 85 MMHG | RESPIRATION RATE: 28 BRPM | SYSTOLIC BLOOD PRESSURE: 149 MMHG | TEMPERATURE: 98 F | HEART RATE: 67 BPM

## 2018-12-31 DIAGNOSIS — K86.9 DISEASE OF PANCREAS, UNSPECIFIED: ICD-10-CM

## 2018-12-31 DIAGNOSIS — I65.23 OCCLUSION AND STENOSIS OF BILATERAL CAROTID ARTERIES: ICD-10-CM

## 2018-12-31 DIAGNOSIS — I10 ESSENTIAL (PRIMARY) HYPERTENSION: ICD-10-CM

## 2018-12-31 DIAGNOSIS — R91.8 OTHER NONSPECIFIC ABNORMAL FINDING OF LUNG FIELD: ICD-10-CM

## 2018-12-31 DIAGNOSIS — Z51.5 ENCOUNTER FOR PALLIATIVE CARE: ICD-10-CM

## 2018-12-31 PROCEDURE — 82550 ASSAY OF CK (CPK): CPT

## 2018-12-31 PROCEDURE — 82962 GLUCOSE BLOOD TEST: CPT

## 2018-12-31 PROCEDURE — 97167 OT EVAL HIGH COMPLEX 60 MIN: CPT

## 2018-12-31 PROCEDURE — 81001 URINALYSIS AUTO W/SCOPE: CPT

## 2018-12-31 PROCEDURE — 80053 COMPREHEN METABOLIC PANEL: CPT

## 2018-12-31 PROCEDURE — 97530 THERAPEUTIC ACTIVITIES: CPT

## 2018-12-31 PROCEDURE — 99232 SBSQ HOSP IP/OBS MODERATE 35: CPT

## 2018-12-31 PROCEDURE — 80061 LIPID PANEL: CPT

## 2018-12-31 PROCEDURE — 99291 CRITICAL CARE FIRST HOUR: CPT | Mod: 25

## 2018-12-31 PROCEDURE — 93306 TTE W/DOPPLER COMPLETE: CPT

## 2018-12-31 PROCEDURE — 85730 THROMBOPLASTIN TIME PARTIAL: CPT

## 2018-12-31 PROCEDURE — 92507 TX SP LANG VOICE COMM INDIV: CPT

## 2018-12-31 PROCEDURE — 97535 SELF CARE MNGMENT TRAINING: CPT

## 2018-12-31 PROCEDURE — 96374 THER/PROPH/DIAG INJ IV PUSH: CPT | Mod: XU

## 2018-12-31 PROCEDURE — 80307 DRUG TEST PRSMV CHEM ANLYZR: CPT

## 2018-12-31 PROCEDURE — 99223 1ST HOSP IP/OBS HIGH 75: CPT

## 2018-12-31 PROCEDURE — 83036 HEMOGLOBIN GLYCOSYLATED A1C: CPT

## 2018-12-31 PROCEDURE — 92523 SPEECH SOUND LANG COMPREHEN: CPT

## 2018-12-31 PROCEDURE — 96375 TX/PRO/DX INJ NEW DRUG ADDON: CPT | Mod: XU

## 2018-12-31 PROCEDURE — 97116 GAIT TRAINING THERAPY: CPT

## 2018-12-31 PROCEDURE — 70498 CT ANGIOGRAPHY NECK: CPT

## 2018-12-31 PROCEDURE — 71260 CT THORAX DX C+: CPT

## 2018-12-31 PROCEDURE — 85027 COMPLETE CBC AUTOMATED: CPT

## 2018-12-31 PROCEDURE — 74177 CT ABD & PELVIS W/CONTRAST: CPT

## 2018-12-31 PROCEDURE — 36415 COLL VENOUS BLD VENIPUNCTURE: CPT

## 2018-12-31 PROCEDURE — 70450 CT HEAD/BRAIN W/O DYE: CPT

## 2018-12-31 PROCEDURE — 84100 ASSAY OF PHOSPHORUS: CPT

## 2018-12-31 PROCEDURE — 93880 EXTRACRANIAL BILAT STUDY: CPT

## 2018-12-31 PROCEDURE — 70496 CT ANGIOGRAPHY HEAD: CPT

## 2018-12-31 PROCEDURE — 99497 ADVNCD CARE PLAN 30 MIN: CPT | Mod: 25

## 2018-12-31 PROCEDURE — 99239 HOSP IP/OBS DSCHRG MGMT >30: CPT

## 2018-12-31 PROCEDURE — 80048 BASIC METABOLIC PNL TOTAL CA: CPT

## 2018-12-31 PROCEDURE — 85610 PROTHROMBIN TIME: CPT

## 2018-12-31 PROCEDURE — 82553 CREATINE MB FRACTION: CPT

## 2018-12-31 PROCEDURE — 83735 ASSAY OF MAGNESIUM: CPT

## 2018-12-31 PROCEDURE — 84484 ASSAY OF TROPONIN QUANT: CPT

## 2018-12-31 PROCEDURE — 97110 THERAPEUTIC EXERCISES: CPT

## 2018-12-31 PROCEDURE — 93005 ELECTROCARDIOGRAM TRACING: CPT

## 2018-12-31 PROCEDURE — 82140 ASSAY OF AMMONIA: CPT

## 2018-12-31 RX ADMIN — CLOPIDOGREL BISULFATE 75 MILLIGRAM(S): 75 TABLET, FILM COATED ORAL at 11:27

## 2018-12-31 RX ADMIN — AMLODIPINE BESYLATE 5 MILLIGRAM(S): 2.5 TABLET ORAL at 05:16

## 2018-12-31 RX ADMIN — Medication 81 MILLIGRAM(S): at 11:27

## 2018-12-31 NOTE — CONSULT NOTE ADULT - PROBLEM SELECTOR RECOMMENDATION 5
New pancreatic head lesion noted on imaging. Family acknowledge that this may suggest an underlying malignancy; electing for conservative management

## 2018-12-31 NOTE — CONSULT NOTE ADULT - REASON FOR ADMISSION
word finding difficulties/not recognizing family

## 2018-12-31 NOTE — CONSULT NOTE ADULT - ASSESSMENT
The patient is a 89y Male who is followed by neurology because of acute CVA, s/p tPA    Stroke  Acute left MCA CVA  s/p tPA  post -tPA orders  Follow up head CT 24 hours after tPA, if no blood start ASA 81 mg daily  continue high dose statin  BP per post tPA protocol  lipid profile  PT/OT/Speech and swallow therapy    CAD  will restart antiplatelet agent after follow up head CT shows no blood    will follow with you    Ren Page MD PhD   [id number]
Mr. Coyle is a 89 year old male admitted on 12/26/18, found to have acute left temporal CVA  s/p tPA, complete Rt vertebral occlusion, bilateral carotid stenosis, RUL lung mass (known to family) and new pancreatic lesion. Course complicated by uncontrolled HTN on admission s/p cardene gtt. Palliative consulted to assist with goc.

## 2018-12-31 NOTE — CONSULT NOTE ADULT - PROBLEM SELECTOR RECOMMENDATION 9
Called and spoke with pt's daughter/HCP Oliva Solomon. The role and scope of palliative was introduced. We reviewed his baseline functional status, medical conditions, hospital course and treatments. He was living with his  (in her 90s), Ruperto, prior to admission. Daughter lives nearby and able to check in on him. PTA, pt was fully independent and still driving. Family concerned that he requires more assistance than family/ can provide; opted for LEONARD and to then reevaluate further GOC. Family aware of the lung mass and now new pancreatic lesion; deferring workup and further aggressive interventions - acknowledge understanding the possibility it could be malignant in nature. Family emphasized importance of good quality of life and keeping him comfortable; does not wish to put pt through any invasive interventions.     Advance directives reviewed. Daughter report having HCP and ADs at home designating dtr and son as HCPs and pt's wish for no life sustaining intervetions including CPR and mechanical intubation. Dtr to bring in copies of forms for our records. Permission granted to complete  MOLST for DNR/DNI. CTH with left MCA CVA. S/P tPA. C/W aspirin, plavix, statin. Plan per neurology

## 2018-12-31 NOTE — PROGRESS NOTE ADULT - ASSESSMENT
89 y/o male with pmhx of remote (>10yrs) history of cardiac stents presents with slurred speech and forgetting family members names found to have left temporal infarct on CT head. Found to have  placed on Cardene infusion then s/p tPA at 1:09 am on 12/26. Ct angio head shows complete block of blood flow to right vertebral artery. Cardene infusion discontinued prior to CT-ICU transfer. Repeat CT after 24 hours shows no hemorrhage or worsening of infarction. Pt transferred to medical/resident team after BP has stabilized. Only moderate expressive and receptive aphasia deficits are notable. He is treated with ASA, plavix and a statin. He is noted to have R carotid stenosis on CT, vascular and family decide to not entertain CEA at this time. A RUL 2cm nodule is found on CXR. It is further characterized by CT. However, on discussion with the family this is a known lesion. They will continue to follow up out patient. Pt is ready for discharge home 12/30, but the family is unable to provide supervision, and request LEONARD.     Acute left MCA CVA with b/l carotid stenosis   h/o cad   -stable, complicated by both receptive + expressive aphasia (moderate previously, mild on exam today)   -s/p tpa on 12/26   -c/w ASA 81 mg po qdaily,  plavix 75 daily  -c/w Atorvastatin 80 mg po qdaily    Hypertension- improved   -c/w current medications and monitor bp   -c/w norvasc    RUL Lung Mass/pancreatic lesion  -RUL spiculated nodule noted and known from prior OP records   -no further intervention regarding pancreatic lesion   -GOC done with pall care - dnr/i    Normocytic anemia   -no e/o acute bleed  -monitor hgb     Leucocytosis - resolved  -no fevers/chills     Elevated bun -   Likely due to oral intake   encourage oral intake/hydration     Preventive measure  -dvt ppx: SCD boots at this time.    Disposition  -LEONARD. Plan for d/c today. D/w PCP Dr Hart and daughter over the phone 89 y/o male with pmhx of remote (>10yrs) history of cardiac stents presents with slurred speech and forgetting family members names found to have left temporal infarct on CT head. Found to have  placed on Cardene infusion then s/p tPA at 1:09 am on 12/26. Ct angio head shows complete block of blood flow to right vertebral artery. Cardene infusion discontinued prior to CT-ICU transfer. Repeat CT after 24 hours shows no hemorrhage or worsening of infarction. Pt transferred to medical/resident team after BP has stabilized. Only moderate expressive and receptive aphasia deficits are notable. He is treated with ASA, plavix and a statin. He is noted to have R carotid stenosis on CT, vascular and family decide to not entertain CEA at this time. A RUL 2cm nodule is found on CXR. It is further characterized by CT. However, on discussion with the family this is a known lesion. They will continue to follow up out patient. Pt is ready for discharge home 12/30, but the family is unable to provide supervision, and request LEONARD.     Acute left MCA CVA with right sided carotid stenosis   h/o cad   -stable, complicated by both receptive + expressive aphasia (moderate previously, mild on exam today)   -s/p tpa on 12/26   -c/w ASA 81 mg po qdaily,  plavix 75 daily  -c/w Atorvastatin 80 mg po qdaily    Hypertension- improved   -c/w current medications and monitor bp   -c/w norvasc    RUL Lung Mass/pancreatic lesion  -RUL spiculated nodule noted and known from prior OP records   -no further intervention regarding pancreatic lesion   -GOC done with pall care - dnr/i    Normocytic anemia   -no e/o acute bleed  -monitor hgb     Leucocytosis - resolved  -no fevers/chills     Elevated bun -   Likely due to oral intake   encourage oral intake/hydration     Preventive measure  -dvt ppx: SCD boots at this time.    Disposition  -LEONARD. Plan for d/c today. D/w PCP Dr Hart and daughter over the phone

## 2018-12-31 NOTE — PROGRESS NOTE ADULT - SUBJECTIVE AND OBJECTIVE BOX
[facility]                                        [facility]                                 Camilo Reed, & Cruz                                  [address]                                        [place], [place], [address]                                             [phone]        CC: Stroke    HPI:   The patient is a 89y Male who presented to [facility] with acute onset of aphasia and confusion.  He was evaluated by tele-stroke, and although an early small left temporal lobe CVA was seen, t-PA was administered because his deficits were larger than what should have been caused by the stroke on CT.      Interim history:  Remains aphasic.     ROS: (Somewhat unreliable due to aphasia).  Denies headache or dizziness.  Denies chest pain.  Denies shortness of breath.    MEDICATIONS  (STANDING):  amLODIPine   Tablet 5 milliGRAM(s) Oral daily  aspirin enteric coated 81 milliGRAM(s) Oral daily  atorvastatin 80 milliGRAM(s) Oral at bedtime  clopidogrel Tablet 75 milliGRAM(s) Oral daily      Vital Signs Last 24 Hrs  T(C): 36.9 (31 Dec 2018 05:12), Max: 36.9 (30 Dec 2018 21:02)  T(F): 98.4 (31 Dec 2018 05:12), Max: 98.4 (30 Dec 2018 21:02)  HR: 80 (31 Dec 2018 08:40) (61 - 82)  BP: 154/71 (31 Dec 2018 05:12) (134/76 - 194/85)  RR: 18 (31 Dec 2018 05:12) (18 - 19)  SpO2: 95% (31 Dec 2018 05:12) (95% - 97%)    Detailed Neurologic Exam:    Mental status: The patient is awake and alert and has expressive>receptive aphasia.    Cranial nerves: Pupils equal and react symmetrically to light. There is no visual field deficit to threat. Extraocular motion is full with no nystagmus. There is no ptosis. Facial sensation is intact. Facial musculature is symmetric. Palate elevates symmetrically. Shoulder shrug is normal. Tongue is midline.    Motor: There is normal bulk and tone.  There is no tremor.  Unable to formally test power due to aphasia, but moves 4 ext well with grossly 5/5 power.    Sensation: Grimace to pin in 4 extremities    Reflexes: 1+ throughout and plantar responses are flexor.    Cerebellar: Difficult to assess although grossly there is no dysmetria on finger to nose testing.

## 2018-12-31 NOTE — PROGRESS NOTE ADULT - ATTENDING COMMENTS
Note addended where needed. Plan discussed with patient/daughter, pcp. Please see discharge papers for details.
Patient seen and examined at the bedside. I was physically present for key portions of the evaluation and management services provided. Agree with the above history, physical, assessment, and plan with the necessary amendments/elaborations below;    clinically stable. no complaints. expressive + receptive aphasia persistent.     Goals of Care discussed at length with the patient's wife + daughter. This conversation included current condition, prognosis, and options for therapy. Discussed desires by patient for further care and wishes were expressed - current management accepted but discussed at length findings on CT chest-abd-pelvis. discussed poss + prob of malignancy. discussed conflicted findings on mra vs carotid doppler regarding plaque disease which may now affect indication for CEA prior to dc . Advanced directives discussed. Family present as noted. at this time family does NOT want pt informed of poss of malignancy, they are now the proxy since pt has lost capacity post cva given aphasia, cannot assess his understanding of situations. they at this time are leaning towards avoiding invasive interventions for w/u for malignancy but would like more time to think about it. they would like follow up regarding the need for CEA given findings on imaging. will discuss with vascular. repeat family meeting with pt son to be planned likely for tomorrow. Length of Conversation ~35min.
Patient seen and examined at the bedside. I was physically present for key portions of the evaluation and management services provided. Agree with the above history, physical, assessment, and plan with the necessary amendments/elaborations already made above.
Patient seen and examined bedside. Agree with the above.

## 2018-12-31 NOTE — PROGRESS NOTE ADULT - PROVIDER SPECIALTY LIST ADULT
Critical Care
Family Medicine
Hospitalist
Neurology
Rehab Medicine
Vascular Surgery
Family Medicine
Neurology

## 2018-12-31 NOTE — CONSULT NOTE ADULT - ATTENDING COMMENTS
Follow up carotid duplex  Dual antiplatelet tx  Statin tx  CT and CT angio imaging reviewed-- 50-69% stenosis bilateral ICA  Will continue to follow
D/W RN and patient's daughter (Oliva via phone)    Thank you for the opportunity to assist with the care of this patient.   [facility] [phone].

## 2018-12-31 NOTE — PROGRESS NOTE ADULT - SUBJECTIVE AND OBJECTIVE BOX
Patient is a 89y old  Male who presents with a chief complaint of word finding difficulties/not recognizing family (30 Dec 2018 09:21)    INTERVAL HPI/OVERNIGHT EVENTS:  Patient seen and examined at bedside. No acute distress and no acute overnight events. States that he is ready and waiting to go home. He states that he is eating well at this time     ROS: No chest pain, palpitations, sob, light headedness/dizziness, difficulty breathing/cough, fevers/chills, abdominal pain, n/v, diarrhea/constipation, dysuria or increased urinary frequency. All other ROS negative     Vital Signs Last 24 Hrs  T(C): 36.6 (31 Dec 2018 10:40), Max: 36.9 (30 Dec 2018 21:02)  T(F): 97.9 (31 Dec 2018 10:40), Max: 98.4 (30 Dec 2018 21:02)  HR: 70 (31 Dec 2018 10:40) (61 - 80)  BP: 139/63 (31 Dec 2018 10:40) (134/76 - 192/72)  RR: 18 (31 Dec 2018 10:40) (18 - 19)  SpO2: 95% (31 Dec 2018 10:40) (95% - 95%)    PHYSICAL EXAM:  GENERAL: Pleasant man appears his age, NAD, well-groomed, well-developed  HEENT: eomi, perrla, no significant pallor noted, unable to note any carotid bruits   NERVOUS SYSTEM:  Alert & Oriented X 1 to person only (and only name) Pt exhibits expressive aphasia intermittently   CHEST/LUNG: fair b/l air entry   HEART: Regular rate; No murmurs, rubs, or gallops  ABDOMEN: Soft, Nontender, Nondistended; Bowel sounds present  EXTREMITIES:  2+ Peripheral Pulses, No clubbing, cyanosis, or edema. Moves all 4 extremities   PSYCH: Normal mood and affect  SKIN: No rashes or lesions    LABS:  12-29  135  |  101  |  24.0<H>  ----------------------------<  126<H>  3.8   |  22.0  |  0.97    Ca    8.8      29 Dec 2018 05:57    Hemoglobin A1C, Whole Blood: 5.4 % (12-28 @ 06:04)      MEDICATIONS  (STANDING):  amLODIPine   Tablet 5 milliGRAM(s) Oral daily  aspirin enteric coated 81 milliGRAM(s) Oral daily  atorvastatin 80 milliGRAM(s) Oral at bedtime  clopidogrel Tablet 75 milliGRAM(s) Oral daily    MEDICATIONS  (PRN):  acetaminophen   Tablet .. 650 milliGRAM(s) Oral every 6 hours PRN Mild Pain (1 - 3), Moderate Pain (4 - 6)  fentaNYL    Injectable 25 MICROGram(s) IV Push every 3 hours PRN Severe Pain (7 - 10)  hydrALAZINE Injectable 10 milliGRAM(s) IV Push every 6 hours PRN sbp > 180 or dbp > 100

## 2018-12-31 NOTE — CONSULT NOTE ADULT - PROBLEM SELECTOR PROBLEM 1
Palliative care encounter Cerebrovascular accident (CVA) due to embolism of left middle cerebral artery

## 2018-12-31 NOTE — CONSULT NOTE ADULT - SUBJECTIVE AND OBJECTIVE BOX
PALLIATIVE CONSULT    CC: Patient is a 89y old  Male who presents with a chief complaint of word finding difficulties/not recognizing family (31 Dec 2018 09:49)    HPI:  Mr. Coyle is a 89 year old male with PMHx of CAD s/p stents (>10 yrs ago) admitted on 12/26/18 with speech difficulty and not recognizing family members. On admission with poorly controlled hypertension (SBP>190) requiring cardene gtt. CTH with acute left temporal lobe infarct; s/p tPA. CTA head and neck with complete right vertebral occlusion and bilateral carotid artery stenosis (50-69%). CT chest revealed a 2 cm RUL spiculated RUL mass and 4 mm pancreatic head lesion.  Many discussions by primary team with family; family aware of known lung mass and continues to deferred workup given pt's advance age and medical history.     Pt seen and examined at bedside this morning. He remains pleasantly confused and with expressive aphasia/word difficulty. Oriented to person only. He reports being comfortable and offered no complaints.      Limited ROS due to speech difficulty and underlying dementia.     PERTINENT PMH REVIEWED: Yes    PAST MEDICAL & SURGICAL HISTORY:  No pertinent past medical history  H/O heart artery stent: x2    SOCIAL HISTORY:    Admitted from:  home    Baseline ADLs (prior to admission):  Independent    Surrogate/HCP/Guardian: Phone#:  - HCP are his daughter (Oliva Solomon: [phone]) and son (Nikolay Coyle)    ADVANCE DIRECTIVES:   Medical Decision Making Capacity: No  DNR YES NO  Completed on:                     MOLST  YES NO   Completed on:  Living Will  YES NO   Completed on:    FAMILY HISTORY:  Unable to obtain due to pt's dementia and aphasia.     Allergies    sulfa drugs (Short breath)    Intolerances    no seeds or spicy foods (Unknown)  pt likes chicken, rice, pasta, vegetables (bland diet) (Unknown)      MEDICATIONS  (STANDING):  amLODIPine   Tablet 5 milliGRAM(s) Oral daily  aspirin enteric coated 81 milliGRAM(s) Oral daily  atorvastatin 80 milliGRAM(s) Oral at bedtime  clopidogrel Tablet 75 milliGRAM(s) Oral daily    MEDICATIONS  (PRN):  acetaminophen   Tablet .. 650 milliGRAM(s) Oral every 6 hours PRN Mild Pain (1 - 3), Moderate Pain (4 - 6)  fentaNYL    Injectable 25 MICROGram(s) IV Push every 3 hours PRN Severe Pain (7 - 10)  hydrALAZINE Injectable 10 milliGRAM(s) IV Push every 6 hours PRN sbp > 180 or dbp > 100      PHYSICAL EXAM:    Vital Signs Last 24 Hrs  T(C): 36.6 (31 Dec 2018 10:40), Max: 36.9 (30 Dec 2018 21:02)  T(F): 97.9 (31 Dec 2018 10:40), Max: 98.4 (30 Dec 2018 21:02)  HR: 70 (31 Dec 2018 10:40) (61 - 80)  BP: 139/63 (31 Dec 2018 10:40) (134/76 - 192/72)  BP(mean): --  RR: 18 (31 Dec 2018 10:40) (18 - 19)  SpO2: 95% (31 Dec 2018 10:40) (95% - 95%)    General: Resting comfortably. No acute distress.   HEENT: mucous membrane moist   Lungs: clear to auscultation bilaterally. no rales, rhonchi, wheezing.  CV: +s1/s2. Regular rate and rhythm.    GI:+ bowel sound. abdomen soft, non-tender, non-distended.  MSK: Moves all 4 extremities.  No cyanosis or edema. +weakness.   Neuro: Awake and alert, oriented to person only. Interactive. +expressive aphasia.   Skin: warm and dry.     LABS:    I&O's Summary    30 Dec 2018 07:01  -  31 Dec 2018 07:00  --------------------------------------------------------  IN: 1200 mL / OUT: 400 mL / NET: 800 mL    RADIOLOGY & ADDITIONAL STUDIES:     CT 12/26/18  FINDINGS: Redemonstrated is acute/subacute moderate size left temporal   lobe infarct on image 22, series 2. Old right occipital lobe cortical   infarct. Old small right caudate head lacunar infarct.  There is periventricular and subcortical white matter hypodensity without   mass effect, nonspecific, likely representing moderate chronic   microvascular ischemic changes. There is no compelling evidence for an   acute transcortical infarction. There is no evidence of mass, mass   effect, midline shift or extra-axial fluid collection. The lateral   ventricles and cortical sulci are age-appropriate in size and   configuration. The patient is status post bilateral ocular lens   replacement surgery. The orbits, mastoid air cells and visualized   paranasal sinuses are unremarkable. The calvarium is intact.    IMPRESSION: Stable moderate size acute/subacute left temporal lobe infarct.    CTA Head and Neck 12/26/18  IMPRESSION:   1. 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 There appears to be tapering of flow proximal to the occlusion seen on series 9 images 86-88. Vertebral artery dissection should be considered. This finding is age indeterminate.   2. Limited visualization of a 2.0 x 1.9 cm mass lesion in right upper   lobe seen on series 6 image 179. Recommend further workup with CT of the   chest. Malignancy is in the differential diagnosis.   3. Findings as described above in the left internal carotid artery   consistent with moderate stenosis measuring 50-69%.   4. Findings as described above in right internal carotid artery   consistent with moderate stenosis measuring 50-69%.     Carotid US 12/28/18  IMPRESSION:     1.  Right carotid system:  50-69% stenosis caused by a moderate to severe   calcified atherosclerotic plaque disease within carotid bulb and distal   common carotid artery. If carotid bruit present or further anatomical assessment required CT angiography recommended.        2.  Left carotid system:  No hemodynamically significant stenosis is found.            CT C/A/P 12/28/18  FINDINGS:   CERVICOTHORACIC JUNCTION AND AXILLARY REGIONS: Within normal limits.    MEDIASTINUM AND EVELYN: No evidence of any endotracheal or central   endobronchial lesion. Evidence of coronary artery calcification. No   pericardial effusion. A mildly enlarged right hilar node. No mediastinal   or left hilar lymphadenopathy. Prominent main pulmonary artery measuring   3.5 cm concerning for pulmonary arterial hypertension.    LUNG PARENCHYMA: Evidence of a lobulated and spiculated 2 cm sized right   upper lobe mass. This is consistent with a primary pulmonary malignancy.   There is subpleural and reticulonodular subsegmental atelectasis   involving the lower lobes bilaterally. Calcified granuloma dependent   right lung base.    PLEURA: Trace bilateral pleural effusions.    THORACIC WALL AND OSSEOUS STRUCTURES: No evidence of any aggressive   osseous lesion. Bilateral old rib fractures.    LIVER: No evidence of a focal hepatic lesion. Patent portal veins.    GALL BLADDER AND BILE DUCTS:  No evidence of any high density gallstones.   No biliary obstruction.    PANCREAS:  4 mm sized low density in the head of the pancreas. It may   represent an IPMN. Additional cystic pancreatic lesions are not   definitely excluded. Surveillance can be considered.    SPLEEN:  Normal.    ADRENALS:  Normal.    KIDNEYS:  2 subcentimeter sized cysts are suspected in the lower pole of   the left kidney but are too small to definitely characterize.    PERITONEUM AND RETROPERITONEUM:  No peritoneal thickening or stranding.   No abnormal fluid or gas collection.    GI:  Uncomplicated diverticulosis predominantly involving the hepatic   flexure, descending and sigmoid colon.    :  Enlarged prostate compressing the bladder base. Seminal vesicles are   symmetrical.    LYMPHATIC SYSTEM:  No abdominal or pelvic lymphadenopathy.    VASCULAR:  Within normal limits.    SKELETAL:  No aggressive osseous lesion. Degenerative disc disease lumbar   spine.    ABDOMINAL WALL: Fat-containing inguinal hernias bilaterally right more   than left.    IMPRESSION:   Right upper lobe 2 cm sized spiculated lung mass consistent with a   primary pulmonary malignancy. A mildly enlarged right hilar node.    Enlarged prostate.    Additional findings as above. PALLIATIVE CONSULT    CC: Patient is a 89y old  Male who presents with a chief complaint of word finding difficulties/not recognizing family (31 Dec 2018 09:49)    HPI:  Mr. Coyle is a 89 year old male with PMHx of CAD s/p stents (>10 yrs ago) admitted on 12/26/18 with speech difficulty and not recognizing family members. On admission with poorly controlled hypertension (SBP>190) requiring cardene gtt. CTH with acute left temporal lobe infarct; s/p tPA. CTA head and neck with complete right vertebral occlusion and bilateral carotid artery stenosis (50-69%). CT chest revealed a 2 cm RUL spiculated RUL mass and 4 mm pancreatic head lesion.  Many discussions by primary team with family; they are aware of known lung mass and continues to deferred workup given pt's advance age and medical history.     Pt seen and examined at bedside this morning. He remains pleasantly confused and with expressive aphasia/word difficulty. Oriented to person only. He reports being comfortable and offered no complaints.      Limited ROS due to speech difficulty and underlying dementia.     PERTINENT PMH REVIEWED: Yes    PAST MEDICAL & SURGICAL HISTORY:  No pertinent past medical history  H/O heart artery stent: x2    SOCIAL HISTORY:    Admitted from:  home    Baseline ADLs (prior to admission):  Independent    Surrogate/HCP/Guardian: Phone#:  - HCP are his daughter (Oliva Solomon: [phone]) and son (Nikolay Coyle)    ADVANCE DIRECTIVES:   Medical Decision Making Capacity: No  DNR YES NO  Completed on:                     MOLST  YES NO   Completed on:  Living Will  YES NO   Completed on:    FAMILY HISTORY:  Unable to obtain due to pt's dementia and aphasia.     Allergies    sulfa drugs (Short breath)    Intolerances    no seeds or spicy foods (Unknown)  pt likes chicken, rice, pasta, vegetables (bland diet) (Unknown)      MEDICATIONS  (STANDING):  amLODIPine   Tablet 5 milliGRAM(s) Oral daily  aspirin enteric coated 81 milliGRAM(s) Oral daily  atorvastatin 80 milliGRAM(s) Oral at bedtime  clopidogrel Tablet 75 milliGRAM(s) Oral daily    MEDICATIONS  (PRN):  acetaminophen   Tablet .. 650 milliGRAM(s) Oral every 6 hours PRN Mild Pain (1 - 3), Moderate Pain (4 - 6)  fentaNYL    Injectable 25 MICROGram(s) IV Push every 3 hours PRN Severe Pain (7 - 10)  hydrALAZINE Injectable 10 milliGRAM(s) IV Push every 6 hours PRN sbp > 180 or dbp > 100      PHYSICAL EXAM:    Vital Signs Last 24 Hrs  T(C): 36.6 (31 Dec 2018 10:40), Max: 36.9 (30 Dec 2018 21:02)  T(F): 97.9 (31 Dec 2018 10:40), Max: 98.4 (30 Dec 2018 21:02)  HR: 70 (31 Dec 2018 10:40) (61 - 80)  BP: 139/63 (31 Dec 2018 10:40) (134/76 - 192/72)  BP(mean): --  RR: 18 (31 Dec 2018 10:40) (18 - 19)  SpO2: 95% (31 Dec 2018 10:40) (95% - 95%)    General: Resting comfortably. No acute distress.   HEENT: mucous membrane moist   Lungs: clear to auscultation bilaterally. no rales, rhonchi, wheezing.  CV: +s1/s2. Regular rate and rhythm.    GI:+ bowel sound. abdomen soft, non-tender, non-distended.  MSK: Moves all 4 extremities.  No cyanosis or edema. +weakness.   Neuro: Awake and alert, oriented to person only. Interactive. +expressive aphasia.   Skin: warm and dry.     LABS:    I&O's Summary    30 Dec 2018 07:01  -  31 Dec 2018 07:00  --------------------------------------------------------  IN: 1200 mL / OUT: 400 mL / NET: 800 mL    RADIOLOGY & ADDITIONAL STUDIES:     CT 12/26/18  FINDINGS: Redemonstrated is acute/subacute moderate size left temporal   lobe infarct on image 22, series 2. Old right occipital lobe cortical   infarct. Old small right caudate head lacunar infarct.  There is periventricular and subcortical white matter hypodensity without   mass effect, nonspecific, likely representing moderate chronic   microvascular ischemic changes. There is no compelling evidence for an   acute transcortical infarction. There is no evidence of mass, mass   effect, midline shift or extra-axial fluid collection. The lateral   ventricles and cortical sulci are age-appropriate in size and   configuration. The patient is status post bilateral ocular lens   replacement surgery. The orbits, mastoid air cells and visualized   paranasal sinuses are unremarkable. The calvarium is intact.    IMPRESSION: Stable moderate size acute/subacute left temporal lobe infarct.    CTA Head and Neck 12/26/18  IMPRESSION:   1. 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 There appears to be tapering of flow proximal to the occlusion seen on series 9 images 86-88. Vertebral artery dissection should be considered. This finding is age indeterminate.   2. Limited visualization of a 2.0 x 1.9 cm mass lesion in right upper   lobe seen on series 6 image 179. Recommend further workup with CT of the   chest. Malignancy is in the differential diagnosis.   3. Findings as described above in the left internal carotid artery   consistent with moderate stenosis measuring 50-69%.   4. Findings as described above in right internal carotid artery   consistent with moderate stenosis measuring 50-69%.     Carotid US 12/28/18  IMPRESSION:     1.  Right carotid system:  50-69% stenosis caused by a moderate to severe   calcified atherosclerotic plaque disease within carotid bulb and distal   common carotid artery. If carotid bruit present or further anatomical assessment required CT angiography recommended.        2.  Left carotid system:  No hemodynamically significant stenosis is found.            CT C/A/P 12/28/18  FINDINGS:   CERVICOTHORACIC JUNCTION AND AXILLARY REGIONS: Within normal limits.    MEDIASTINUM AND EVELYN: No evidence of any endotracheal or central   endobronchial lesion. Evidence of coronary artery calcification. No   pericardial effusion. A mildly enlarged right hilar node. No mediastinal   or left hilar lymphadenopathy. Prominent main pulmonary artery measuring   3.5 cm concerning for pulmonary arterial hypertension.    LUNG PARENCHYMA: Evidence of a lobulated and spiculated 2 cm sized right   upper lobe mass. This is consistent with a primary pulmonary malignancy.   There is subpleural and reticulonodular subsegmental atelectasis   involving the lower lobes bilaterally. Calcified granuloma dependent   right lung base.    PLEURA: Trace bilateral pleural effusions.    THORACIC WALL AND OSSEOUS STRUCTURES: No evidence of any aggressive   osseous lesion. Bilateral old rib fractures.    LIVER: No evidence of a focal hepatic lesion. Patent portal veins.    GALL BLADDER AND BILE DUCTS:  No evidence of any high density gallstones.   No biliary obstruction.    PANCREAS:  4 mm sized low density in the head of the pancreas. It may   represent an IPMN. Additional cystic pancreatic lesions are not   definitely excluded. Surveillance can be considered.    SPLEEN:  Normal.    ADRENALS:  Normal.    KIDNEYS:  2 subcentimeter sized cysts are suspected in the lower pole of   the left kidney but are too small to definitely characterize.    PERITONEUM AND RETROPERITONEUM:  No peritoneal thickening or stranding.   No abnormal fluid or gas collection.    GI:  Uncomplicated diverticulosis predominantly involving the hepatic   flexure, descending and sigmoid colon.    :  Enlarged prostate compressing the bladder base. Seminal vesicles are   symmetrical.    LYMPHATIC SYSTEM:  No abdominal or pelvic lymphadenopathy.    VASCULAR:  Within normal limits.    SKELETAL:  No aggressive osseous lesion. Degenerative disc disease lumbar   spine.    ABDOMINAL WALL: Fat-containing inguinal hernias bilaterally right more   than left.    IMPRESSION:   Right upper lobe 2 cm sized spiculated lung mass consistent with a   primary pulmonary malignancy. A mildly enlarged right hilar node.    Enlarged prostate.    Additional findings as above.

## 2018-12-31 NOTE — PROGRESS NOTE ADULT - SUBJECTIVE AND OBJECTIVE BOX
Patient in bed, continues to have word finding difficulties.   Reports no pain and that he slept ok.   Asks when he can leave.    As per medical documentation, plan is to defer CEA at this time.      FUNCTIONAL PROGRESS    Sit-Stand Transfer Training  Transfer Training Sit-to-Stand Transfer: supervision   Transfer Training Stand-to-Sit Transfer: supervision     Gait Training  Gait Trainin feetx2 RW supervision  Gait Analysis: variable gait velocities requring verbal cues for safety, mild unsteadiness c turns, dual tasks and obstacle negotiation with self recovery    Stair Training  Assistive Device: 10 stepsx2 2 rails supervision, verbal cues to decrease velocity of task for safety and for hand rail use        REVIEW OF SYSTEMS  Constitutional - No fever,  +fatigue  HEENT - No vertigo, No neck pain  Neurological - No headaches, No loss of strength, No numbness, No tremors  Skin - No rashes, No lesions   Musculoskeletal - No joint pain, No joint swelling, No muscle pain  Psychiatric - No depression, +anxiety    VITALS  T(C): 36.9 (18 @ 05:12), Max: 36.9 (18 @ 21:02)  HR: 80 (18 @ 08:40) (61 - 82)  BP: 154/71 (18 @ 05:12) (134/76 - 194/85)  RR: 18 (18 @ 05:1 (18 - 19)  SpO2: 95% (18 @ 05:12) (95% - 97%)  Wt(kg): --    MEDICATIONS   acetaminophen   Tablet .. 650 milliGRAM(s) every 6 hours PRN  amLODIPine   Tablet 5 milliGRAM(s) daily  aspirin enteric coated 81 milliGRAM(s) daily  atorvastatin 80 milliGRAM(s) at bedtime  clopidogrel Tablet 75 milliGRAM(s) daily  fentaNYL    Injectable 25 MICROGram(s) every 3 hours PRN  hydrALAZINE Injectable 10 milliGRAM(s) every 6 hours PRN      RECENT LABS/IMAGING          ------------------------------------------------------------------  PHYSICAL EXAM  Constitutional - NAD, Comfortable  HEENT - NCAT, EOMI  Neck - Supple, No limited ROM  Chest - Breathing comfortably, No wheezing  Cardiovascular - S1S2   Abdomen - Soft   Extremities - No C/C/E, No calf tenderness   Neurologic Exam -                    Cognitive - Awake, Alert, AAO to self, not place, not situation, not month with choices      Communication - Fluent, Echolalia, Word finding difficulties, Anomia     Cranial Nerves - CN 2-12 intact     Motor - No focal deficits                    LEFT    UE - ShAB 5/5, EF 5/5, EE 5/5, WE 5/5,  5/5                    RIGHT UE - ShAB 5/5, EF 5/5, EE 5/5, WE 5/5,  5/5                    LEFT    LE - HF 5/5, KE 5/5, DF 5/5, PF 5/5                    RIGHT LE - HF 5/5, KE 5/5, DF 5/5, PF 5/5        Sensory - Intact to LT  Psychiatric - Mood frustrated, irritable, Affect WNL  ----------------------------------------------------------------------------------------  ASSESSMENT/PLAN  89yMale with functional deficits after an acute left temporal CVA and having been found a suspicious lung mass and new pancreatic mass	   Pain - Tylenol, Recommend DC 	Fentanyl  CVA - ASA, Lipitor  HTN - Norvasc, Apresoline  DVT PPX - SCDs  Rehab - Patient is ambulating 65feet supervision. Patient would be able to be DC home. As per notations in chart, family unable to provide supervision. Language deficits will continue to be the barrier for a longer period of time that will not fully resolve over the course of 2 weeks. Therefore, recommend LEONARD. 	Will sign off, please reconsult for additional rehab dispo needs if functional status changes.

## 2018-12-31 NOTE — PROGRESS NOTE ADULT - ASSESSMENT
89y Male who is followed by neurology because of acute CVA, s/p tPA    Stroke  Acute left MCA CVA  s/p tPA  Follow up head CT 24 hours after tPA no blood. ASA 81 mg daily started.  Continue high dose statin  Rehab eval appreciated.   Plan is for subacute rehabilitation.    Carotid stenosis.  Vascular following.  Symptomatic side by doppler is not as severe as on CT-A.    Vascular service has signed off.     Lung/pancreas masses.   Family aware and would not likely be pursuing aggressive intervention.     Discharge planning.   Stable from neuro standpoint for subacute rehabilitation.     No further specific neurologic recommendations. Will be available as needed.

## 2018-12-31 NOTE — PROGRESS NOTE ADULT - REASON FOR ADMISSION
cva
word finding difficulties/not recognizing family

## 2019-01-02 RX ORDER — ZOLPIDEM TARTRATE 10 MG/1
10 TABLET, FILM COATED ORAL
Refills: 0 | Status: DISCONTINUED | COMMUNITY
End: 2019-01-02

## 2019-01-02 RX ORDER — CLOPIDOGREL 75 MG/1
75 TABLET, FILM COATED ORAL DAILY
Refills: 0 | Status: ACTIVE | COMMUNITY

## 2019-01-02 RX ORDER — ATORVASTATIN CALCIUM 40 MG/1
40 TABLET, FILM COATED ORAL
Qty: 30 | Refills: 0 | Status: DISCONTINUED | COMMUNITY
Start: 2018-02-15 | End: 2019-01-02

## 2019-01-02 RX ORDER — ATORVASTATIN CALCIUM 80 MG/1
80 TABLET, FILM COATED ORAL
Refills: 0 | Status: ACTIVE | COMMUNITY

## 2019-01-02 RX ORDER — AMLODIPINE BESYLATE 5 MG/1
5 TABLET ORAL DAILY
Refills: 0 | Status: ACTIVE | COMMUNITY

## 2019-01-02 RX ORDER — ACETAMINOPHEN 325 MG/1
325 TABLET ORAL EVERY 6 HOURS
Refills: 0 | Status: ACTIVE | COMMUNITY

## 2019-01-02 RX ORDER — ASPIRIN 81 MG
81 TABLET, DELAYED RELEASE (ENTERIC COATED) ORAL DAILY
Refills: 0 | Status: ACTIVE | COMMUNITY

## 2019-04-29 ENCOUNTER — APPOINTMENT (OUTPATIENT)
Dept: NEUROLOGY | Facility: CLINIC | Age: 84
End: 2019-04-29
Payer: MEDICARE

## 2019-04-29 DIAGNOSIS — I63.9 CEREBRAL INFARCTION, UNSPECIFIED: ICD-10-CM

## 2019-04-29 PROCEDURE — 99215 OFFICE O/P EST HI 40 MIN: CPT

## 2019-04-29 RX ORDER — AZITHROMYCIN 250 MG/1
250 TABLET, FILM COATED ORAL
Qty: 6 | Refills: 0 | Status: ACTIVE | COMMUNITY
Start: 2018-12-18

## 2019-04-29 RX ORDER — MUPIROCIN 20 MG/G
2 OINTMENT TOPICAL
Qty: 22 | Refills: 0 | Status: ACTIVE | COMMUNITY
Start: 2019-04-25

## 2019-04-29 RX ORDER — CEFADROXIL 500 MG/1
500 CAPSULE ORAL
Qty: 14 | Refills: 0 | Status: ACTIVE | COMMUNITY
Start: 2019-04-25

## 2019-04-29 RX ORDER — BENZONATATE 200 MG/1
200 CAPSULE ORAL
Qty: 30 | Refills: 0 | Status: ACTIVE | COMMUNITY
Start: 2018-12-18

## 2019-05-02 NOTE — SPEECH LANGUAGE PATHOLOGY EVALUATION - SLP PERTINENT HISTORY OF CURRENT PROBLEM
As per h&p: Pt came to ED :word finding difficulties/not recognizing family. Patient was then evaluated by telestroke neurologist who deemed patient candidate for tPA. However patient was noted to be hypertensive with SBP >190 (despite no PMH of HTN) he was given IVP labetalol and started on cardene drip by ER staff prior to recieiving tPA axillary

## 2019-05-17 ENCOUNTER — TRANSCRIPTION ENCOUNTER (OUTPATIENT)
Age: 84
End: 2019-05-17

## 2019-09-05 ENCOUNTER — TRANSCRIPTION ENCOUNTER (OUTPATIENT)
Age: 84
End: 2019-09-05

## 2019-10-21 ENCOUNTER — APPOINTMENT (OUTPATIENT)
Dept: NEUROLOGY | Facility: CLINIC | Age: 84
End: 2019-10-21

## 2021-03-24 NOTE — CONSULT NOTE ADULT - CONSULT REQUESTED DATE/TIME
26-Dec-2018 14:26 no chest pain/no palpitations/no dyspnea on exertion/no orthopnea/no paroxysmal nocturnal dyspnea/no peripheral edema/no claudication

## 2021-06-14 NOTE — PHYSICAL THERAPY INITIAL EVALUATION ADULT - LEVEL OF INDEPENDENCE: SIT/SUPINE, REHAB EVAL
stand-by assist Enbrel Counseling:  I discussed with the patient the risks of etanercept including but not limited to myelosuppression, immunosuppression, autoimmune hepatitis, demyelinating diseases, lymphoma, and infections.  The patient understands that monitoring is required including a PPD at baseline and must alert us or the primary physician if symptoms of infection or other concerning signs are noted.

## 2022-02-23 NOTE — H&P ADULT - ENMT
02/23/22                            Marcial Cruz  [address] [facility] [address]    To Whom It May Concern:    This is to certify Marcial LOCO Anthony was evaluated with Kemi Krishna MD on 2/22/22 and can return work on 2/23/22.     RESTRICTIONS: No lifting greater than 20 pounds.        Kemi Krishna MD  [facility]-[address]   [facility] [address]  Phone: [phone]  Fax: [phone]     No oral lesions; no gross abnormalities

## 2023-11-15 NOTE — CONSULT NOTE ADULT - PROBLEM SELECTOR RECOMMENDATION 6
Objective testing NOT warranted given no overt signs of impaired airway protection and CXR is clear Called and spoke with pt's daughter/HCP Oliva Solomon. The role and scope of palliative was introduced. We reviewed his baseline functional status, medical conditions, hospital course and treatments. He was living with his  (in her 90s), Ruperto, prior to admission. Daughter lives nearby and able to check in on him. PTA, pt was fully independent and still driving. Family concerned that he requires more assistance than family/ can provide; opted for LEONARD and to then reevaluate further GOC. Family aware of the lung mass and now new pancreatic lesion; deferring workup and further aggressive interventions - acknowledge understanding the possibility it could be malignant in nature. Family emphasized importance of good quality of life and keeping him comfortable; does not wish to put pt through any invasive interventions.     Advance directives reviewed. Daughter report having HCP and ADs at home designating dtr and son as HCPs and pt's wish for no life sustaining intervetions including CPR and mechanical intubation. Dtr to bring in copies of forms for our records. Permission granted to complete  MOLST for DNR/DNI.

## 2024-09-25 NOTE — INPATIENT CERTIFICATION FOR MEDICARE PATIENTS - THE STATUS OF COMORBIDITIES.
Patient follows with oncology Dr. Paulino at [facility] every 3 months.    Continue chemotherapy as per oncology.          2. The status of comorbities. (See ED/admit documents)